# Patient Record
Sex: FEMALE | Race: BLACK OR AFRICAN AMERICAN | URBAN - METROPOLITAN AREA
[De-identification: names, ages, dates, MRNs, and addresses within clinical notes are randomized per-mention and may not be internally consistent; named-entity substitution may affect disease eponyms.]

---

## 2017-01-10 ENCOUNTER — PRE VISIT (OUTPATIENT)
Dept: ANESTHESIOLOGY | Facility: CLINIC | Age: 47
End: 2017-01-10

## 2017-01-10 NOTE — TELEPHONE ENCOUNTER
1.  Date/reason for appt: 1/31/17 at 2 PM - Degenerative Disc Disease  2.  Referring provider: Dr. Roya Mitchell at [facility]  3.  Call to patient (Yes / No - short description): no, referred  4.  Previous care at:   - [facility] (recs requested)

## 2017-01-16 NOTE — TELEPHONE ENCOUNTER
Records received from [facility], will forward to clinic. Waiting for imaging.  Included:   Office notes/ED- 10/2015- 11/2015, 2/2016-9/2016  Labs   Xray hip pelvis left on 4/12/16  CT angio neck on 5/12/16  CT chest on 5/12/16  MRI Lspine on 5/20/16    Additional records at [facility].

## 2017-01-17 NOTE — TELEPHONE ENCOUNTER
Fax sent to Jeannie ([facility]) to push images to Pacs.  Mailed ZIGGY for [facility] records to home address.

## 2017-01-24 ENCOUNTER — MYC MEDICAL ADVICE (OUTPATIENT)
Dept: ANESTHESIOLOGY | Facility: CLINIC | Age: 47
End: 2017-01-24

## 2017-01-24 ASSESSMENT — ENCOUNTER SYMPTOMS
DIARRHEA: 0
LEG PAIN: 1
DOUBLE VISION: 1
ABDOMINAL PAIN: 0
SMELL DISTURBANCE: 1
BREAST MASS: 0
EXERCISE INTOLERANCE: 0
EYE IRRITATION: 1
DECREASED APPETITE: 1
HOT FLASHES: 0
SORE THROAT: 0
SEIZURES: 0
HEMOPTYSIS: 0
SLEEP DISTURBANCES DUE TO BREATHING: 1
TACHYCARDIA: 0
JAUNDICE: 0
DECREASED CONCENTRATION: 1
RECTAL BLEEDING: 0
SPEECH CHANGE: 0
WEIGHT GAIN: 0
SPUTUM PRODUCTION: 1
HOARSE VOICE: 1
DECREASED LIBIDO: 0
RESPIRATORY PAIN: 0
NECK PAIN: 1
NIGHT SWEATS: 1
COUGH: 1
CHILLS: 1
RECTAL PAIN: 0
NERVOUS/ANXIOUS: 1
LIGHT-HEADEDNESS: 0
VOMITING: 0
FATIGUE: 1
SINUS PAIN: 0
DISTURBANCES IN COORDINATION: 1
MEMORY LOSS: 1
JOINT SWELLING: 1
ARTHRALGIAS: 1
BOWEL INCONTINENCE: 0
EYE REDNESS: 0
SINUS CONGESTION: 1
CONSTIPATION: 0
WHEEZING: 1
MYALGIAS: 1
HEADACHES: 0
TASTE DISTURBANCE: 0
WEIGHT LOSS: 0
HALLUCINATIONS: 0
NECK MASS: 0
COUGH DISTURBING SLEEP: 1
WEAKNESS: 1
NAUSEA: 1
SYNCOPE: 0
EYE PAIN: 0
ORTHOPNEA: 1
BREAST PAIN: 1
POLYDIPSIA: 0
MUSCLE WEAKNESS: 1
BLOOD IN STOOL: 0
PANIC: 1
NUMBNESS: 1
TINGLING: 1
BACK PAIN: 1
HEARTBURN: 1
PARALYSIS: 0
STIFFNESS: 1
HYPOTENSION: 0
POSTURAL DYSPNEA: 1
INCREASED ENERGY: 0
SHORTNESS OF BREATH: 1
PALPITATIONS: 0
TREMORS: 1
MUSCLE CRAMPS: 1
LEG SWELLING: 1
BLOATING: 1
DIZZINESS: 1
DYSPNEA ON EXERTION: 0
INSOMNIA: 1
EYE WATERING: 0
LOSS OF CONSCIOUSNESS: 0
FEVER: 0
TROUBLE SWALLOWING: 0
CLAUDICATION: 1
HYPERTENSION: 1
DEPRESSION: 0
SNORES LOUDLY: 1
ALTERED TEMPERATURE REGULATION: 0
POLYPHAGIA: 0

## 2017-01-24 ASSESSMENT — ANXIETY QUESTIONNAIRES
GAD7 TOTAL SCORE: 13
7. FEELING AFRAID AS IF SOMETHING AWFUL MIGHT HAPPEN: 2 = MORE THAN HALF THE DAYS
GAD7 TOTAL SCORE: 13

## 2017-01-25 ASSESSMENT — ANXIETY QUESTIONNAIRES: GAD7 TOTAL SCORE: 13

## 2017-01-30 NOTE — TELEPHONE ENCOUNTER
Per [facility], pt reached out to Sarmad Tavares regarding [facility] records. Message sent to Sarmad to see if pt returned her ZIGGY. Per Sarmad, pt has not. Sarmad will email patient our fax number to return the ZIGGY form.

## 2017-01-31 ENCOUNTER — OFFICE VISIT (OUTPATIENT)
Dept: ANESTHESIOLOGY | Facility: CLINIC | Age: 47
End: 2017-01-31

## 2017-01-31 VITALS
HEART RATE: 91 BPM | HEIGHT: 64 IN | BODY MASS INDEX: 38.55 KG/M2 | SYSTOLIC BLOOD PRESSURE: 150 MMHG | OXYGEN SATURATION: 98 % | WEIGHT: 225.8 LBS | DIASTOLIC BLOOD PRESSURE: 93 MMHG

## 2017-01-31 DIAGNOSIS — M47.817 LUMBOSACRAL SPONDYLOSIS WITHOUT MYELOPATHY: Primary | ICD-10-CM

## 2017-01-31 DIAGNOSIS — M79.18 MYOFASCIAL PAIN: ICD-10-CM

## 2017-01-31 RX ORDER — LIDOCAINE 50 MG/G
OINTMENT TOPICAL
COMMUNITY

## 2017-01-31 RX ORDER — TRAZODONE HYDROCHLORIDE 100 MG/1
TABLET ORAL
COMMUNITY

## 2017-01-31 RX ORDER — ATORVASTATIN CALCIUM 20 MG/1
20 TABLET, FILM COATED ORAL DAILY
COMMUNITY
Start: 2015-11-04

## 2017-01-31 RX ORDER — QUETIAPINE FUMARATE 100 MG/1
TABLET, FILM COATED ORAL
COMMUNITY

## 2017-01-31 RX ORDER — METHOCARBAMOL 500 MG/1
500 TABLET, FILM COATED ORAL 4 TIMES DAILY PRN
Qty: 120 TABLET | Refills: 3 | Status: SHIPPED | OUTPATIENT
Start: 2017-01-31

## 2017-01-31 RX ORDER — CETIRIZINE HYDROCHLORIDE 10 MG/1
10 TABLET ORAL PRN
COMMUNITY

## 2017-01-31 RX ORDER — GABAPENTIN 300 MG/1
600 CAPSULE ORAL 3 TIMES DAILY
Qty: 180 CAPSULE | Refills: 3 | Status: SHIPPED | OUTPATIENT
Start: 2017-01-31

## 2017-01-31 RX ORDER — AMMONIUM LACTATE 12 G/100G
LOTION TOPICAL
COMMUNITY
Start: 2016-09-22

## 2017-01-31 RX ORDER — PROCHLORPERAZINE MALEATE 10 MG
TABLET ORAL
COMMUNITY

## 2017-01-31 RX ORDER — FLUTICASONE PROPIONATE 50 MCG
SPRAY, SUSPENSION (ML) NASAL
COMMUNITY

## 2017-01-31 RX ORDER — GABAPENTIN 300 MG/1
CAPSULE ORAL
COMMUNITY

## 2017-01-31 RX ORDER — DIVALPROEX SODIUM 250 MG/1
250 TABLET, DELAYED RELEASE ORAL 3 TIMES DAILY
COMMUNITY

## 2017-01-31 RX ORDER — LISINOPRIL 20 MG/1
TABLET ORAL
COMMUNITY

## 2017-01-31 RX ORDER — IBUPROFEN 800 MG/1
TABLET, FILM COATED ORAL
COMMUNITY

## 2017-01-31 ASSESSMENT — PAIN SCALES - GENERAL: PAINLEVEL: EXTREME PAIN (8)

## 2017-01-31 NOTE — TELEPHONE ENCOUNTER
Pain records (office notes & injections) received from [facility], forwarded to clinic.

## 2017-01-31 NOTE — MR AVS SNAPSHOT
After Visit Summary   1/31/2017    Lorie Vann    MRN: [id number]           Patient Information     Date Of Birth          1970        Visit Information        Provider Department      1/31/2017 2:00 PM Nick Jackson MD [facility]        Today's Diagnoses     Lumbosacral spondylosis without myelopathy    -  1     Myofascial pain           Care Instructions    1. Start gabapentin as follows:  1 tab= 300mg    AM   PM   Bedtime  300mg (1 tab)  300mg (1 tab)  300 (1 tab).      AM   PM   Bedtime  300   300   600mg (2 tab).  After 3-4 days, increase as tolerated to the next line  600mg (2 tab)  300   600 (2 tab).  After 3-4 days, increase as tolerated to the next line  600mg (2 tab)  600mg (2 tab)  600 (2 tab).  After 3-4 days, increase as tolerated to the next line    Call with any problems or when you are at this dose. We can prescribe 600mg tabs going forwards.     Caution for sedation.   Do not drive until you know how the medication affects you.   You can go slower if you need to or increasing only one dose at a time.  Do not stop abruptly once at higher doses.  This medication must be tapered off.      2. Take muscle relaxer methocarbamol (robaxin)    3. Make an appointment for physical therapy. Call [phone]      Follow up: 6-8 weeks      To speak with a nurse, schedule/reschedule/cancel a clinic appointment, or request a medication refill call: [phone]     You can also reach us by [employer]: [email or web address]    For refills, please call on Monday, 1 week before your medication runs out. The doctors are not always in clinic, so this gives us time to get your prescriptions ready.  Please let us know the name of the medication you are requesting a refill of.                                   Follow-ups after your visit        Additional Services     PHYSICAL THERAPY REFERRAL       *This therapy referral will be filtered to a  centralized scheduling office at [facility] and the patient will receive a call to schedule an appointment at a [facility] location most convenient for them. *     [facility] provides Physical Therapy evaluation and treatment and many specialty services across the [facility] system.  If requesting a specialty program, please choose from the list below.    If you have not heard from the scheduling office within 2 business days, please call [phone] for all locations, with the exception of [place], please call [phone].  Treatment: Evaluation & Treatment  Special Instructions/Modalities: none  Special Programs: low back pain     Please be aware that coverage of these services is subject to the terms and limitations of your health insurance plan.  Call member services at your health plan with any benefit or coverage questions.      **Note to Provider:  If you are referring outside of [facility] for the therapy appointment, please list the name of the location in the  special instructions  above, print the referral and give to the patient to schedule the appointment.                  Who to contact     Please call your clinic at [phone] to:    Ask questions about your health    Make or cancel appointments    Discuss your medicines    Learn about your test results    Speak to your doctor   If you have compliments or concerns about an experience at your clinic, or if you wish to file a complaint, please contact [facility] Patient Relations at [phone] or email us at [email or web address]         Additional Information About Your Visit        MyChart Information     [employer] gives you secure access to your electronic health record. If you see a primary care provider, you can also send messages to your care team and make appointments. If you have questions, please call your primary care clinic.  If you do not have a primary care  "provider, please call [phone] and they will assist you.      SubHub is an electronic gateway that provides easy, online access to your medical records. With SubHub, you can request a clinic appointment, read your test results, renew a prescription or communicate with your care team.     To access your existing account, please contact your [facility] or call [phone] for assistance.        Care EveryWhere ID     This is your Care EveryWhere ID. This could be used by other organizations to access your [facility] medical records  [id number]        Your Vitals Were     Pulse Height BMI (Body Mass Index) Pulse Oximetry Last Period Breastfeeding?    91 1.626 m (5' 4\") 38.74 kg/m2 98% 01/17/2017 No       Blood Pressure from Last 3 Encounters:   01/31/17 150/93    Weight from Last 3 Encounters:   01/31/17 102.422 kg (225 lb 12.8 oz)              We Performed the Following     PHYSICAL THERAPY REFERRAL          Today's Medication Changes          These changes are accurate as of: 1/31/17  2:54 PM.  If you have any questions, ask your nurse or doctor.               Start taking these medicines.        Dose/Directions    methocarbamol 500 MG tablet   Commonly known as:  ROBAXIN   Used for:  Myofascial pain   Started by:  Nick Jackson MD        Dose:  500 mg   Take 1 tablet (500 mg) by mouth 4 times daily as needed for muscle spasms   Quantity:  120 tablet   Refills:  3         These medicines have changed or have updated prescriptions.        Dose/Directions    * gabapentin 300 MG capsule   Commonly known as:  NEURONTIN   This may have changed:  Another medication with the same name was added. Make sure you understand how and when to take each.   Changed by:  Nick Jackson MD        Refills:  0       * gabapentin 300 MG capsule   Commonly known as:  NEURONTIN   This may have changed:  You were already taking a medication with the same name, and this prescription was added. " Make sure you understand how and when to take each.   Used for:  Lumbosacral spondylosis without myelopathy   Changed by:  Nick Jackson MD        Dose:  600 mg   Take 2 capsules (600 mg) by mouth 3 times daily Take as directed in clinic   Quantity:  180 capsule   Refills:  3       * Notice:  This list has 2 medication(s) that are the same as other medications prescribed for you. Read the directions carefully, and ask your doctor or other care provider to review them with you.         Where to get your medicines      These medications were sent to [id number] - [place], [place] - [address], [place] [address]     Phone:  [phone]    - gabapentin 300 MG capsule  - methocarbamol 500 MG tablet             Primary Care Provider Office Phone # Fax #    Roya Guillen [phone]       [address] [address]        Thank you!     Thank you for choosing [facility]  for your care. Our goal is always to provide you with excellent care. Hearing back from our patients is one way we can continue to improve our services. Please take a few minutes to complete the written survey that you may receive in the mail after your visit with us. Thank you!             Your Updated Medication List - Protect others around you: Learn how to safely use, store and throw away your medicines at www.disposemymeds.org.          This list is accurate as of: 1/31/17  2:54 PM.  Always use your most recent med list.                   Brand Name Dispense Instructions for use    ammonium lactate 12 % lotion    LAC-HYDRIN         ASPIRIN LOW DOSE 81 MG EC tablet   Generic drug:  aspirin      81 mg daily       atorvastatin 20 MG tablet    LIPITOR     20 mg daily       cetirizine 10 MG tablet    zyrTEC     10 mg as needed       divalproex 250 MG EC tablet    DEPAKOTE     250 mg 3 times daily        dulaglutide 1.5 MG/0.5ML pen    TRULICITY         fluticasone 50 MCG/ACT spray    FLONASE         * gabapentin 300 MG capsule    NEURONTIN         * gabapentin 300 MG capsule    NEURONTIN    180 capsule    Take 2 capsules (600 mg) by mouth 3 times daily Take as directed in clinic       ibuprofen 800 MG tablet    ADVIL/MOTRIN         lidocaine 5 % ointment    XYLOCAINE         lisinopril 20 MG tablet    PRINIVIL/ZESTRIL         methocarbamol 500 MG tablet    ROBAXIN    120 tablet    Take 1 tablet (500 mg) by mouth 4 times daily as needed for muscle spasms       omeprazole 20 MG CR capsule    priLOSEC         prochlorperazine 10 MG tablet    COMPAZINE         QUEtiapine 100 MG tablet    SEROquel         sitagliptin 100 MG tablet    JANUVIA         traZODone 100 MG tablet    DESYREL         * Notice:  This list has 2 medication(s) that are the same as other medications prescribed for you. Read the directions carefully, and ask your doctor or other care provider to review them with you.

## 2017-01-31 NOTE — PATIENT INSTRUCTIONS
1. Start gabapentin as follows:  1 tab= 300mg    AM   PM   Bedtime  300mg (1 tab)  300mg (1 tab)  300 (1 tab).      AM   PM   Bedtime  300   300   600mg (2 tab).  After 3-4 days, increase as tolerated to the next line  600mg (2 tab)  300   600 (2 tab).  After 3-4 days, increase as tolerated to the next line  600mg (2 tab)  600mg (2 tab)  600 (2 tab).  After 3-4 days, increase as tolerated to the next line    Call with any problems or when you are at this dose. We can prescribe 600mg tabs going forwards.     Caution for sedation.   Do not drive until you know how the medication affects you.   You can go slower if you need to or increasing only one dose at a time.  Do not stop abruptly once at higher doses.  This medication must be tapered off.      2. Take muscle relaxer methocarbamol (robaxin)    3. Make an appointment for physical therapy. Call [phone]      Follow up: 6-8 weeks      To speak with a nurse, schedule/reschedule/cancel a clinic appointment, or request a medication refill call: [phone]     You can also reach us by [employer]: [email or web address]    For refills, please call on Monday, 1 week before your medication runs out. The doctors are not always in clinic, so this gives us time to get your prescriptions ready.  Please let us know the name of the medication you are requesting a refill of.

## 2017-01-31 NOTE — Clinical Note
1/31/2017       RE: Lorie Vann  [address]  [place] [address]     Dear Colleague,    Thank you for referring your patient, Lorie Vann, to the [facility] at [facility]. Please see a copy of my visit note below.    I had the pleasure of meeting Ms. Lorie Vann on 1/31/2017 in the outpatient pain clinic in consult for Dr. Guillen with regards to her low back pain.  Subjective:  Lorie Vann is a pleasant 46 year old female with a past medical history of diabetic peripheral neuropathy who presents to the pain clinic with a chief complaint of low back pain.  The pain began in 2003.  There was no inciting event of injury that precipitated the onset of her pain.  She describes the back pain as a 'knot right in my lower back.'  The pain is located mainly on the left lower aspect of her lumbar spine.  The pain waxes and wanes in severity. The pain is worse with sitting, standing, and walking for prolonged periods.  The pain is better with certain medications, including Lidocaine cream, gabapentin, and Percocet.  She states that she has not taken any opioids in the last 2 years.  The patient also has associated left leg pain.  She describes this as an achy feeling which travels from her left buttock, into her posterior thigh, and down into the posterior leg. This pain is intermittent. She has chronic pain and numbness in her feet and toes secondary to diabetic peripheral neuropathy.  The patient denies focal lower extremity weakness. No lower extremity sensory changes other than altered/ diminished in stocking pattern in distal lower extremities. No incontinence of bowel or bladder.    The patient previously has been seen at [facility] pain clinic in [place].  She was taking chronic opioids while being seen there.  She mentions that the last time she was seen at the previous clinic was over 2 years ago.  The reason why the patient  left was because she states that they were making her take a medication which she did not feel was appropriate.  She also states that one of her urine tests came back positive for alcohol.  At the previous clinic, the patient had both lumbar epidural steroid injections (L4/5, and L5/S1) as well as lumbar medial branch blocks (left L2/3/4/5).  She did not have significant or sustained relief with the epidural injections.  However, she states that the lumbar MBB provided significant pain relief the day of the procedure.  However, she never followed-up on having a radiofrequency ablation procedure performed.    ?  Past medical history.    Diabetes, obesity    past surgical history:  No history of spine surgeries     Medications:  Current Outpatient Prescriptions   Medication     aspirin (ASPIRIN LOW DOSE) 81 MG EC tablet     atorvastatin (LIPITOR) 20 MG tablet     cetirizine (ZYRTEC) 10 MG tablet     divalproex (DEPAKOTE) 250 MG EC tablet     dulaglutide (TRULICITY) 1.5 MG/0.5ML pen     fluticasone (FLONASE) 50 MCG/ACT spray     gabapentin (NEURONTIN) 300 MG capsule     ibuprofen (ADVIL/MOTRIN) 800 MG tablet     lidocaine (XYLOCAINE) 5 % ointment     lisinopril (PRINIVIL/ZESTRIL) 20 MG tablet     omeprazole (PRILOSEC) 20 MG CR capsule     prochlorperazine (COMPAZINE) 10 MG tablet     QUEtiapine (SEROQUEL) 100 MG tablet     sitagliptin (JANUVIA) 100 MG tablet     traZODone (DESYREL) 100 MG tablet     ammonium lactate (LAC-HYDRIN) 12 % lotion     No current facility-administered medications for this visit.     [place] and [place] Prescription Monitoring Program reviewed    Allergies:     Allergies   Allergen Reactions     Seasonal Allergies Other (See Comments)     Family History:  family history is not on file.  Social history:  Patient is not currently working.  Smoking-  3-4 cigarettes/day x 15 yrs.  EtOH- Denies.  Illicit drug use- Denies.   Review Of Systems  Skin: negative  Eyes: negative  Ears/Nose/Throat:  "negative  Respiratory: No shortness of breath, dyspnea on exertion, cough, or hemoptysis  Cardiovascular: negative  Gastrointestinal: negative  Genitourinary: negative  Musculoskeletal: back pain and joint pain  Neurologic: negative  Psychiatric: anxiety, depression, no thoughts of self-harm  Hematologic/Lymphatic/Immunologic: negative  Endocrine: diabetes    Objective:   /93 mmHg  Pulse 91  Ht 1.626 m (5' 4\")  Wt 102.422 kg (225 lb 12.8 oz)  BMI 38.74 kg/m2  SpO2 98%  LMP 01/17/2017  Breastfeeding? No  Body mass index is 38.74 kg/(m^2).  General: In no apparent distress  Mental status: Normal affect, pleasant  Head: Atraumatic, normocephalic  Eyes: Extra-ocular movements intact, no scleral icterus  Cardiovascular: Regular rate,   Respiratory: No respiratory distress  Abdomen: soft, non-distended  Msk: Bilateral hips, knees have normal range of motion. Pain with extension and rotation of lumbar spine.  Pain moderate with palpation over bilateral greater trochanteric bursae.  Neuro: AAOx3.   CN II-XII are grossly intact.   Strength 5/5 for bilateral hip flexion, knee extension, and plantarflexion.  4+/5 for left dorsiflexion, 5/5 for right dorsiflexion.  Sensation intact to light touch throughout the L2-S1 dermatomes of the bilateral lower extremities. Reflexes 2+/4 and symmetric for bilateral patellar, achilles.   Skin: No rashes or lesions noted on exposed areas of skin  Lymph: no supraclavicular lymphadenopathy    Imaging:   Lumbar MRI (5/20/16)  Findings:  Normal vertebral body alignment. No fracture vertebral body loss of height. No spinal ceases. No ligamentous injury. Normal marrow signal. No suspicious osseous lesions. Normal conus terminates at L1-2.  T10-11 and T11-12: No spinal canal or neural foraminal narrowing.  T12-L1, L1-2 and L2-3: No spinal canal or neural foraminal narrowing.  L3-4: No spinal canal or neural foraminal narrowing. Mild bilateral facet arthropathy.  L4-5: Annular bulge " with flattening of the ventral thecal sac. No narrowing of spinal canal. No neural foraminal narrowing. Mild bilateral facet arthropathy.  L5-S1: Disk degeneration with posterior disc bulge. Flattening of the ventral thecal sac. No impingement of the traversing S1 nerve roots. Ligamentum flavum facet hypertrophy results in mild narrowing of the bilateral neural foramina. Mild bilateral facet arthropathy.  Normal paraspinal soft tissues.    Impression:  1. Normal alignment. No fracture or spondylolisthesis   2. At L5-S1, disk degeneration with posterior disk bulging. No narrowing of the spinal canal. Stable mild narrowing of the bilateral neural foramina   3. No spinal canal or neural foraminal narrowing at the remaining levels.    Dictated by Prabhjot Hinkle MD @ May 20 2016 12:39PM    Assessment:  1. Chronic, axial low back pain: seems most likely facet-mediated pain. Less likely sacroiliac joint dysfunction. Perhaps occasional radicular pain as well, but this is not the main pain complaint. No red flags such as progressive weakness.   2. Diabetic peripheral neuropathy  3. Obesity     Barriers:  1. None    Plan:   1. Patient education: I went over the above diagnoses and treatment plan with her and answered all of her questions.  2. Imaging review: None  3. Exercise program:  referral for physical therapy as it has been some time since she has had formal therapy.   4. Medications:    1. increase her current gabapentin dose from 300 mg TID to 600 mg TID as tolerated.  2. start the patient on Methocarbamol 500 mg QID for muscle spasm/pain.  The patient previously has not tolerated Flexeril due to sedation effects.  5. Imaging Orders:  None  6. Interventions:  consider a bilateral lumbar medial branch block if the patient fails to improve with physical therapy and medications.  She had a previous lumbar MBB at [facility] pain clinic and noted significant improvement in her back pain.  However, she never followed up for the  radiofrequency ablation procedure.   Based on history and physical exam, unlikely tumor or mass causing pain  7. Referrals:  PT  8. Follow up:  6-8 weeks.    Thank you for the consult.   Patient seen and case discussed with Dr Heredia, Pain Fellow.    I agree with the personal history, family history, social history, and review of systems as completed by the fellow.  The remainder of the encounter was performed by me and scribed by the fellow. I have edited the above note. The note accurately reflects my personal services and the decisions made by me.     Nick Jackson MD  Pain Medicine  Physical Medicine and Rehabilitation  [facility] Department of Anesthesia

## 2017-01-31 NOTE — PROGRESS NOTES
I had the pleasure of meeting Ms. Lorie Vann on 1/31/2017 in the outpatient pain clinic in consult for Dr. Guillen with regards to her low back pain.  Subjective:  Lorie Vann is a pleasant 46 year old female with a past medical history of diabetic peripheral neuropathy who presents to the pain clinic with a chief complaint of low back pain.  The pain began in 2003.  There was no inciting event of injury that precipitated the onset of her pain.  She describes the back pain as a 'knot right in my lower back.'  The pain is located mainly on the left lower aspect of her lumbar spine.  The pain waxes and wanes in severity. The pain is worse with sitting, standing, and walking for prolonged periods.  The pain is better with certain medications, including Lidocaine cream, gabapentin, and Percocet.  She states that she has not taken any opioids in the last 2 years.  The patient also has associated left leg pain.  She describes this as an achy feeling which travels from her left buttock, into her posterior thigh, and down into the posterior leg. This pain is intermittent. She has chronic pain and numbness in her feet and toes secondary to diabetic peripheral neuropathy.  The patient denies focal lower extremity weakness. No lower extremity sensory changes other than altered/ diminished in stocking pattern in distal lower extremities. No incontinence of bowel or bladder.    The patient previously has been seen at [facility] pain clinic in [place].  She was taking chronic opioids while being seen there.  She mentions that the last time she was seen at the previous clinic was over 2 years ago.  The reason why the patient left was because she states that they were making her take a medication which she did not feel was appropriate.  She also states that one of her urine tests came back positive for alcohol.  At the previous clinic, the patient had both lumbar epidural steroid injections (L4/5, and L5/S1) as well as lumbar  medial branch blocks (left L2/3/4/5).  She did not have significant or sustained relief with the epidural injections.  However, she states that the lumbar MBB provided significant pain relief the day of the procedure.  However, she never followed-up on having a radiofrequency ablation procedure performed.    ?  Past medical history.    Diabetes, obesity    past surgical history:  No history of spine surgeries     Medications:  Current Outpatient Prescriptions   Medication     aspirin (ASPIRIN LOW DOSE) 81 MG EC tablet     atorvastatin (LIPITOR) 20 MG tablet     cetirizine (ZYRTEC) 10 MG tablet     divalproex (DEPAKOTE) 250 MG EC tablet     dulaglutide (TRULICITY) 1.5 MG/0.5ML pen     fluticasone (FLONASE) 50 MCG/ACT spray     gabapentin (NEURONTIN) 300 MG capsule     ibuprofen (ADVIL/MOTRIN) 800 MG tablet     lidocaine (XYLOCAINE) 5 % ointment     lisinopril (PRINIVIL/ZESTRIL) 20 MG tablet     omeprazole (PRILOSEC) 20 MG CR capsule     prochlorperazine (COMPAZINE) 10 MG tablet     QUEtiapine (SEROQUEL) 100 MG tablet     sitagliptin (JANUVIA) 100 MG tablet     traZODone (DESYREL) 100 MG tablet     ammonium lactate (LAC-HYDRIN) 12 % lotion     No current facility-administered medications for this visit.     [place] and [place] Prescription Monitoring Program reviewed    Allergies:     Allergies   Allergen Reactions     Seasonal Allergies Other (See Comments)     Family History:  family history is not on file.  Social history:  Patient is not currently working.  Smoking-  3-4 cigarettes/day x 15 yrs.  EtOH- Denies.  Illicit drug use- Denies.   Review Of Systems  Skin: negative  Eyes: negative  Ears/Nose/Throat: negative  Respiratory: No shortness of breath, dyspnea on exertion, cough, or hemoptysis  Cardiovascular: negative  Gastrointestinal: negative  Genitourinary: negative  Musculoskeletal: back pain and joint pain  Neurologic: negative  Psychiatric: anxiety, depression, no thoughts of  "self-harm  Hematologic/Lymphatic/Immunologic: negative  Endocrine: diabetes    Objective:   /93 mmHg  Pulse 91  Ht 1.626 m (5' 4\")  Wt 102.422 kg (225 lb 12.8 oz)  BMI 38.74 kg/m2  SpO2 98%  LMP 01/17/2017  Breastfeeding? No  Body mass index is 38.74 kg/(m^2).  General: In no apparent distress  Mental status: Normal affect, pleasant  Head: Atraumatic, normocephalic  Eyes: Extra-ocular movements intact, no scleral icterus  Cardiovascular: Regular rate,   Respiratory: No respiratory distress  Abdomen: soft, non-distended  Msk: Bilateral hips, knees have normal range of motion. Pain with extension and rotation of lumbar spine.  Pain moderate with palpation over bilateral greater trochanteric bursae.  Neuro: AAOx3.   CN II-XII are grossly intact.   Strength 5/5 for bilateral hip flexion, knee extension, and plantarflexion.  4+/5 for left dorsiflexion, 5/5 for right dorsiflexion.  Sensation intact to light touch throughout the L2-S1 dermatomes of the bilateral lower extremities. Reflexes 2+/4 and symmetric for bilateral patellar, achilles.   Skin: No rashes or lesions noted on exposed areas of skin  Lymph: no supraclavicular lymphadenopathy    Imaging:   Lumbar MRI (5/20/16)  Findings:  Normal vertebral body alignment. No fracture vertebral body loss of height. No spinal ceases. No ligamentous injury. Normal marrow signal. No suspicious osseous lesions. Normal conus terminates at L1-2.  T10-11 and T11-12: No spinal canal or neural foraminal narrowing.  T12-L1, L1-2 and L2-3: No spinal canal or neural foraminal narrowing.  L3-4: No spinal canal or neural foraminal narrowing. Mild bilateral facet arthropathy.  L4-5: Annular bulge with flattening of the ventral thecal sac. No narrowing of spinal canal. No neural foraminal narrowing. Mild bilateral facet arthropathy.  L5-S1: Disk degeneration with posterior disc bulge. Flattening of the ventral thecal sac. No impingement of the traversing S1 nerve roots. " Ligamentum flavum facet hypertrophy results in mild narrowing of the bilateral neural foramina. Mild bilateral facet arthropathy.  Normal paraspinal soft tissues.    Impression:  1. Normal alignment. No fracture or spondylolisthesis   2. At L5-S1, disk degeneration with posterior disk bulging. No narrowing of the spinal canal. Stable mild narrowing of the bilateral neural foramina   3. No spinal canal or neural foraminal narrowing at the remaining levels.    Dictated by Prabhjot Hinkle MD @ May 20 2016 12:39PM    Assessment:  1. Chronic, axial low back pain: seems most likely facet-mediated pain. Less likely sacroiliac joint dysfunction. Perhaps occasional radicular pain as well, but this is not the main pain complaint. No red flags such as progressive weakness.   2. Diabetic peripheral neuropathy  3. Obesity     Barriers:  1. None    Plan:   1. Patient education: I went over the above diagnoses and treatment plan with her and answered all of her questions.  2. Imaging review: None  3. Exercise program:  referral for physical therapy as it has been some time since she has had formal therapy.   4. Medications:    1. increase her current gabapentin dose from 300 mg TID to 600 mg TID as tolerated.  2. start the patient on Methocarbamol 500 mg QID for muscle spasm/pain.  The patient previously has not tolerated Flexeril due to sedation effects.  5. Imaging Orders:  None  6. Interventions:  consider a bilateral lumbar medial branch block if the patient fails to improve with physical therapy and medications.  She had a previous lumbar MBB at [facility] pain clinic and noted significant improvement in her back pain.  However, she never followed up for the radiofrequency ablation procedure.   Based on history and physical exam, unlikely tumor or mass causing pain  7. Referrals:  PT  8. Follow up:  6-8 weeks.    Thank you for the consult.   Patient seen and case discussed with Dr Heredia, Pain Fellow.    I agree with the personal  history, family history, social history, and review of systems as completed by the fellow.  The remainder of the encounter was performed by me and scribed by the fellow. I have edited the above note. The note accurately reflects my personal services and the decisions made by me.     Nick Jackson MD  Pain Medicine  Physical Medicine and Rehabilitation  [facility] Department of Anesthesia

## 2017-03-29 ENCOUNTER — RECORDS - HEALTHEAST (OUTPATIENT)
Dept: ADMINISTRATIVE | Facility: OTHER | Age: 47
End: 2017-03-29

## 2017-04-27 ENCOUNTER — OFFICE VISIT - HEALTHEAST (OUTPATIENT)
Dept: INTERNAL MEDICINE | Facility: CLINIC | Age: 47
End: 2017-04-27

## 2017-04-27 DIAGNOSIS — M25.511 CHRONIC RIGHT SHOULDER PAIN: ICD-10-CM

## 2017-04-27 DIAGNOSIS — G89.4 CHRONIC PAIN SYNDROME: ICD-10-CM

## 2017-04-27 DIAGNOSIS — G37.9 DEMYELINATING DISEASE (H): ICD-10-CM

## 2017-04-27 DIAGNOSIS — E11.42 TYPE 2 DIABETES MELLITUS WITH DIABETIC POLYNEUROPATHY, WITHOUT LONG-TERM CURRENT USE OF INSULIN (H): ICD-10-CM

## 2017-04-27 DIAGNOSIS — F17.200 TOBACCO DEPENDENCE: ICD-10-CM

## 2017-04-27 DIAGNOSIS — G89.29 CHRONIC RIGHT SHOULDER PAIN: ICD-10-CM

## 2017-04-27 ASSESSMENT — MIFFLIN-ST. JEOR: SCORE: 1617.42

## 2017-04-28 ENCOUNTER — COMMUNICATION - HEALTHEAST (OUTPATIENT)
Dept: INTERNAL MEDICINE | Facility: CLINIC | Age: 47
End: 2017-04-28

## 2017-05-10 ENCOUNTER — COMMUNICATION - HEALTHEAST (OUTPATIENT)
Dept: INTERNAL MEDICINE | Facility: CLINIC | Age: 47
End: 2017-05-10

## 2017-05-11 ENCOUNTER — RECORDS - HEALTHEAST (OUTPATIENT)
Dept: ADMINISTRATIVE | Facility: OTHER | Age: 47
End: 2017-05-11

## 2017-05-31 ENCOUNTER — RECORDS - HEALTHEAST (OUTPATIENT)
Dept: ADMINISTRATIVE | Facility: OTHER | Age: 47
End: 2017-05-31

## 2017-06-06 ENCOUNTER — OFFICE VISIT - HEALTHEAST (OUTPATIENT)
Dept: INTERNAL MEDICINE | Facility: CLINIC | Age: 47
End: 2017-06-06

## 2017-06-06 DIAGNOSIS — M75.111 INCOMPLETE TEAR OF RIGHT ROTATOR CUFF: ICD-10-CM

## 2017-06-06 DIAGNOSIS — E11.9 DIABETES MELLITUS (H): ICD-10-CM

## 2017-06-06 ASSESSMENT — MIFFLIN-ST. JEOR: SCORE: 1543.94

## 2017-07-13 ENCOUNTER — COMMUNICATION - HEALTHEAST (OUTPATIENT)
Dept: INTERNAL MEDICINE | Facility: CLINIC | Age: 47
End: 2017-07-13

## 2017-07-14 ENCOUNTER — COMMUNICATION - HEALTHEAST (OUTPATIENT)
Dept: INTERNAL MEDICINE | Facility: CLINIC | Age: 47
End: 2017-07-14

## 2017-07-14 ENCOUNTER — OFFICE VISIT - HEALTHEAST (OUTPATIENT)
Dept: FAMILY MEDICINE | Facility: CLINIC | Age: 47
End: 2017-07-14

## 2017-07-14 DIAGNOSIS — Z86.19 H/O SYPHILIS: ICD-10-CM

## 2017-07-14 DIAGNOSIS — N89.8 VAGINAL ITCHING: ICD-10-CM

## 2017-07-14 DIAGNOSIS — Z20.2 POSSIBLE EXPOSURE TO STD: ICD-10-CM

## 2017-07-14 DIAGNOSIS — G89.29 CHRONIC PAIN: ICD-10-CM

## 2017-07-14 LAB — HIV 1+2 AB+HIV1 P24 AG SERPL QL IA: NEGATIVE

## 2017-07-14 ASSESSMENT — MIFFLIN-ST. JEOR: SCORE: 1527.61

## 2017-07-15 ENCOUNTER — COMMUNICATION - HEALTHEAST (OUTPATIENT)
Dept: INTERNAL MEDICINE | Facility: CLINIC | Age: 47
End: 2017-07-15

## 2017-07-15 LAB — SYPHILIS RPR SCREEN - HISTORICAL: NORMAL

## 2017-08-15 ENCOUNTER — RECORDS - HEALTHEAST (OUTPATIENT)
Dept: ADMINISTRATIVE | Facility: OTHER | Age: 47
End: 2017-08-15

## 2017-09-19 ENCOUNTER — COMMUNICATION - HEALTHEAST (OUTPATIENT)
Dept: INTERNAL MEDICINE | Facility: CLINIC | Age: 47
End: 2017-09-19

## 2017-11-21 ENCOUNTER — RECORDS - HEALTHEAST (OUTPATIENT)
Dept: ADMINISTRATIVE | Facility: OTHER | Age: 47
End: 2017-11-21

## 2018-01-07 ENCOUNTER — HEALTH MAINTENANCE LETTER (OUTPATIENT)
Age: 48
End: 2018-01-07

## 2018-04-11 ENCOUNTER — OFFICE VISIT - HEALTHEAST (OUTPATIENT)
Dept: FAMILY MEDICINE | Facility: CLINIC | Age: 48
End: 2018-04-11

## 2018-04-11 DIAGNOSIS — I49.9 IRREGULAR HEARTBEAT: ICD-10-CM

## 2018-04-11 DIAGNOSIS — Z00.00 ANNUAL PHYSICAL EXAM: ICD-10-CM

## 2018-04-11 DIAGNOSIS — F17.200 TOBACCO DEPENDENCE: ICD-10-CM

## 2018-04-11 DIAGNOSIS — N92.6 IRREGULAR MENSES: ICD-10-CM

## 2018-04-11 DIAGNOSIS — G89.29 OTHER CHRONIC PAIN: ICD-10-CM

## 2018-04-11 DIAGNOSIS — F32.A CHRONIC DEPRESSION: ICD-10-CM

## 2018-04-11 DIAGNOSIS — E11.42 TYPE 2 DIABETES MELLITUS WITH DIABETIC POLYNEUROPATHY, WITHOUT LONG-TERM CURRENT USE OF INSULIN (H): ICD-10-CM

## 2018-04-11 DIAGNOSIS — F25.0 SCHIZOAFFECTIVE DISORDER, BIPOLAR TYPE (H): ICD-10-CM

## 2018-04-11 DIAGNOSIS — G62.9 PERIPHERAL NEUROPATHY: ICD-10-CM

## 2018-04-11 DIAGNOSIS — E11.49 TYPE 2 DIABETES MELLITUS WITH NEUROLOGICAL MANIFESTATIONS (H): ICD-10-CM

## 2018-04-11 DIAGNOSIS — Z11.3 SCREEN FOR STD (SEXUALLY TRANSMITTED DISEASE): ICD-10-CM

## 2018-04-11 DIAGNOSIS — N89.8 VAGINAL IRRITATION: ICD-10-CM

## 2018-04-11 DIAGNOSIS — F30.9 BIPOLAR I DISORDER, SINGLE MANIC EPISODE (H): ICD-10-CM

## 2018-04-11 DIAGNOSIS — R87.619 ABNORMAL PAP SMEAR OF CERVIX: ICD-10-CM

## 2018-04-11 DIAGNOSIS — I10 HYPERTENSION WITH GOAL BLOOD PRESSURE LESS THAN 140/90: ICD-10-CM

## 2018-04-11 LAB
CLUE CELLS: NORMAL
HCG UR QL: NEGATIVE
HIV 1+2 AB+HIV1 P24 AG SERPL QL IA: NEGATIVE
SP GR UR STRIP: >=1.03 (ref 1–1.03)
TRICHOMONAS, WET PREP: NORMAL
YEAST, WET PREP: NORMAL

## 2018-04-11 ASSESSMENT — MIFFLIN-ST. JEOR: SCORE: 1499.82

## 2018-04-12 LAB
C TRACH DNA SPEC QL PROBE+SIG AMP: NEGATIVE
HPV SOURCE: ABNORMAL
HUMAN PAPILLOMA VIRUS 16 DNA: NEGATIVE
HUMAN PAPILLOMA VIRUS 18 DNA: NEGATIVE
HUMAN PAPILLOMA VIRUS FINAL DIAGNOSIS: ABNORMAL
HUMAN PAPILLOMA VIRUS OTHER HR: POSITIVE
N GONORRHOEA DNA SPEC QL NAA+PROBE: NEGATIVE
RPR (REFLEX ORDER ONLY): NORMAL
SPECIMEN DESCRIPTION: ABNORMAL
T PALLIDUM AB SER QL: ABNORMAL

## 2018-04-13 LAB — T PALLIDUM AB SER QL AGGL: REACTIVE

## 2018-04-17 LAB
BKR LAB AP ABNORMAL BLEEDING: NO
BKR LAB AP BIRTH CONTROL/HORMONES: NORMAL
BKR LAB AP CERVICAL APPEARANCE: NORMAL
BKR LAB AP GYN ADEQUACY: NORMAL
BKR LAB AP GYN INTERPRETATION: NORMAL
BKR LAB AP GYN OTHER FINDINGS: NORMAL
BKR LAB AP HPV REFLEX: NORMAL
BKR LAB AP LMP: NORMAL
BKR LAB AP PATIENT STATUS: NORMAL
BKR LAB AP PREVIOUS ABNORMAL: NORMAL
BKR LAB AP PREVIOUS NORMAL: 2017
HIGH RISK?: YES
PATH REPORT.COMMENTS IMP SPEC: NORMAL
RESULT FLAG (HE HISTORICAL CONVERSION): NORMAL

## 2018-04-18 ENCOUNTER — COMMUNICATION - HEALTHEAST (OUTPATIENT)
Dept: SCHEDULING | Facility: CLINIC | Age: 48
End: 2018-04-18

## 2018-05-17 ENCOUNTER — COMMUNICATION - HEALTHEAST (OUTPATIENT)
Dept: FAMILY MEDICINE | Facility: CLINIC | Age: 48
End: 2018-05-17

## 2018-05-29 ENCOUNTER — OFFICE VISIT - HEALTHEAST (OUTPATIENT)
Dept: FAMILY MEDICINE | Facility: CLINIC | Age: 48
End: 2018-05-29

## 2018-05-29 DIAGNOSIS — B35.3 TINEA PEDIS OF BOTH FEET: ICD-10-CM

## 2018-05-29 DIAGNOSIS — I10 HYPERTENSION WITH GOAL BLOOD PRESSURE LESS THAN 140/90: ICD-10-CM

## 2018-05-29 DIAGNOSIS — R35.0 URINARY FREQUENCY: ICD-10-CM

## 2018-05-29 DIAGNOSIS — E11.49 TYPE 2 DIABETES MELLITUS WITH NEUROLOGICAL MANIFESTATIONS (H): ICD-10-CM

## 2018-05-29 DIAGNOSIS — F25.0 SCHIZOAFFECTIVE DISORDER, BIPOLAR TYPE (H): ICD-10-CM

## 2018-05-29 DIAGNOSIS — N89.8 VAGINAL ITCHING: ICD-10-CM

## 2018-05-29 DIAGNOSIS — E03.9 HYPOTHYROIDISM: ICD-10-CM

## 2018-05-29 DIAGNOSIS — Z12.31 VISIT FOR SCREENING MAMMOGRAM: ICD-10-CM

## 2018-05-29 DIAGNOSIS — E78.2 MIXED HYPERLIPIDEMIA: ICD-10-CM

## 2018-05-29 DIAGNOSIS — F32.A CHRONIC DEPRESSION: ICD-10-CM

## 2018-05-29 LAB
ALBUMIN SERPL-MCNC: 3.9 G/DL (ref 3.5–5)
ALBUMIN UR-MCNC: NEGATIVE MG/DL
ALP SERPL-CCNC: 83 U/L (ref 45–120)
ALT SERPL W P-5'-P-CCNC: 14 U/L (ref 0–45)
ANION GAP SERPL CALCULATED.3IONS-SCNC: 11 MMOL/L (ref 5–18)
APPEARANCE UR: CLEAR
AST SERPL W P-5'-P-CCNC: 13 U/L (ref 0–40)
BILIRUB DIRECT SERPL-MCNC: <0.1 MG/DL
BILIRUB SERPL-MCNC: 0.3 MG/DL (ref 0–1)
BILIRUB UR QL STRIP: NEGATIVE
BUN SERPL-MCNC: 24 MG/DL (ref 8–22)
CALCIUM SERPL-MCNC: 10 MG/DL (ref 8.5–10.5)
CHLORIDE BLD-SCNC: 108 MMOL/L (ref 98–107)
CHOLEST SERPL-MCNC: 212 MG/DL
CLUE CELLS: NORMAL
CO2 SERPL-SCNC: 23 MMOL/L (ref 22–31)
COLOR UR AUTO: YELLOW
CREAT SERPL-MCNC: 0.8 MG/DL (ref 0.6–1.1)
FASTING STATUS PATIENT QL REPORTED: YES
GFR SERPL CREATININE-BSD FRML MDRD: >60 ML/MIN/1.73M2
GLUCOSE BLD-MCNC: 84 MG/DL (ref 70–125)
GLUCOSE UR STRIP-MCNC: NEGATIVE MG/DL
HBA1C MFR BLD: 6.8 % (ref 3.5–6)
HDLC SERPL-MCNC: 53 MG/DL
HGB UR QL STRIP: NEGATIVE
KETONES UR STRIP-MCNC: NEGATIVE MG/DL
LDLC SERPL CALC-MCNC: 119 MG/DL
LEUKOCYTE ESTERASE UR QL STRIP: NEGATIVE
NITRATE UR QL: NEGATIVE
PH UR STRIP: 5 [PH] (ref 5–8)
POTASSIUM BLD-SCNC: 4.3 MMOL/L (ref 3.5–5)
PROT SERPL-MCNC: 6.5 G/DL (ref 6–8)
SODIUM SERPL-SCNC: 142 MMOL/L (ref 136–145)
SP GR UR STRIP: >=1.03 (ref 1–1.03)
TRICHOMONAS, WET PREP: NORMAL
TRIGL SERPL-MCNC: 201 MG/DL
TSH SERPL DL<=0.005 MIU/L-ACNC: 0.41 UIU/ML (ref 0.3–5)
UROBILINOGEN UR STRIP-ACNC: NORMAL
YEAST, WET PREP: NORMAL

## 2018-05-30 LAB — BACTERIA SPEC CULT: NO GROWTH

## 2018-06-08 ENCOUNTER — AMBULATORY - HEALTHEAST (OUTPATIENT)
Dept: EDUCATION SERVICES | Facility: CLINIC | Age: 48
End: 2018-06-08

## 2018-06-08 DIAGNOSIS — E11.49 TYPE 2 DIABETES MELLITUS WITH NEUROLOGICAL MANIFESTATIONS (H): ICD-10-CM

## 2018-08-16 ENCOUNTER — RECORDS - HEALTHEAST (OUTPATIENT)
Dept: ADMINISTRATIVE | Facility: OTHER | Age: 48
End: 2018-08-16

## 2018-09-04 ENCOUNTER — OFFICE VISIT - HEALTHEAST (OUTPATIENT)
Dept: ADDICTION MEDICINE | Facility: CLINIC | Age: 48
End: 2018-09-04

## 2018-09-04 DIAGNOSIS — F14.20 COCAINE USE DISORDER, MODERATE, DEPENDENCE (H): ICD-10-CM

## 2018-09-04 DIAGNOSIS — F41.1 GENERALIZED ANXIETY DISORDER: ICD-10-CM

## 2018-09-04 DIAGNOSIS — F25.0 SCHIZOAFFECTIVE DISORDER, BIPOLAR TYPE (H): ICD-10-CM

## 2018-09-06 ENCOUNTER — AMBULATORY - HEALTHEAST (OUTPATIENT)
Dept: ADDICTION MEDICINE | Facility: CLINIC | Age: 48
End: 2018-09-06

## 2018-09-07 ENCOUNTER — AMBULATORY - HEALTHEAST (OUTPATIENT)
Dept: ADDICTION MEDICINE | Facility: CLINIC | Age: 48
End: 2018-09-07

## 2018-09-10 ENCOUNTER — COMMUNICATION - HEALTHEAST (OUTPATIENT)
Dept: ADDICTION MEDICINE | Facility: CLINIC | Age: 48
End: 2018-09-10

## 2018-09-10 ENCOUNTER — OFFICE VISIT - HEALTHEAST (OUTPATIENT)
Dept: ADDICTION MEDICINE | Facility: CLINIC | Age: 48
End: 2018-09-10

## 2018-09-10 DIAGNOSIS — F14.20 COCAINE USE DISORDER, MODERATE, DEPENDENCE (H): ICD-10-CM

## 2018-09-11 ENCOUNTER — OFFICE VISIT - HEALTHEAST (OUTPATIENT)
Dept: ADDICTION MEDICINE | Facility: CLINIC | Age: 48
End: 2018-09-11

## 2018-09-11 DIAGNOSIS — F14.20 COCAINE USE DISORDER, MODERATE, DEPENDENCE (H): ICD-10-CM

## 2018-09-15 ENCOUNTER — AMBULATORY - HEALTHEAST (OUTPATIENT)
Dept: ADDICTION MEDICINE | Facility: CLINIC | Age: 48
End: 2018-09-15

## 2018-09-17 ENCOUNTER — OFFICE VISIT - HEALTHEAST (OUTPATIENT)
Dept: ADDICTION MEDICINE | Facility: CLINIC | Age: 48
End: 2018-09-17

## 2018-09-17 DIAGNOSIS — F14.20 COCAINE USE DISORDER, MODERATE, DEPENDENCE (H): ICD-10-CM

## 2018-09-22 ENCOUNTER — AMBULATORY - HEALTHEAST (OUTPATIENT)
Dept: ADDICTION MEDICINE | Facility: CLINIC | Age: 48
End: 2018-09-22

## 2018-10-04 ENCOUNTER — COMMUNICATION - HEALTHEAST (OUTPATIENT)
Dept: ADDICTION MEDICINE | Facility: CLINIC | Age: 48
End: 2018-10-04

## 2018-10-06 ENCOUNTER — AMBULATORY - HEALTHEAST (OUTPATIENT)
Dept: ADDICTION MEDICINE | Facility: CLINIC | Age: 48
End: 2018-10-06

## 2018-10-08 ENCOUNTER — OFFICE VISIT - HEALTHEAST (OUTPATIENT)
Dept: ADDICTION MEDICINE | Facility: CLINIC | Age: 48
End: 2018-10-08

## 2018-10-08 ENCOUNTER — AMBULATORY - HEALTHEAST (OUTPATIENT)
Dept: ADDICTION MEDICINE | Facility: CLINIC | Age: 48
End: 2018-10-08

## 2018-10-08 DIAGNOSIS — F14.20 COCAINE USE DISORDER, MODERATE, DEPENDENCE (H): ICD-10-CM

## 2018-10-09 ENCOUNTER — OFFICE VISIT - HEALTHEAST (OUTPATIENT)
Dept: ADDICTION MEDICINE | Facility: CLINIC | Age: 48
End: 2018-10-09

## 2018-10-09 DIAGNOSIS — F14.20 COCAINE USE DISORDER, MODERATE, DEPENDENCE (H): ICD-10-CM

## 2018-10-12 ENCOUNTER — AMBULATORY - HEALTHEAST (OUTPATIENT)
Dept: ADDICTION MEDICINE | Facility: CLINIC | Age: 48
End: 2018-10-12

## 2018-10-14 ENCOUNTER — COMMUNICATION - HEALTHEAST (OUTPATIENT)
Dept: FAMILY MEDICINE | Facility: CLINIC | Age: 48
End: 2018-10-14

## 2018-10-14 DIAGNOSIS — B35.3 TINEA PEDIS OF BOTH FEET: ICD-10-CM

## 2018-10-14 DIAGNOSIS — Z91.09 ENVIRONMENTAL ALLERGIES: ICD-10-CM

## 2018-10-14 DIAGNOSIS — F32.A CHRONIC DEPRESSION: ICD-10-CM

## 2018-10-14 DIAGNOSIS — E11.9 DIABETES MELLITUS, TYPE 2 (H): ICD-10-CM

## 2018-10-14 DIAGNOSIS — G89.4 CHRONIC PAIN SYNDROME: ICD-10-CM

## 2018-10-14 DIAGNOSIS — J30.89 ENVIRONMENTAL AND SEASONAL ALLERGIES: ICD-10-CM

## 2018-10-15 ENCOUNTER — OFFICE VISIT - HEALTHEAST (OUTPATIENT)
Dept: ADDICTION MEDICINE | Facility: CLINIC | Age: 48
End: 2018-10-15

## 2018-10-15 DIAGNOSIS — F14.20 COCAINE USE DISORDER, MODERATE, DEPENDENCE (H): ICD-10-CM

## 2018-10-16 ENCOUNTER — OFFICE VISIT - HEALTHEAST (OUTPATIENT)
Dept: ADDICTION MEDICINE | Facility: CLINIC | Age: 48
End: 2018-10-16

## 2018-10-16 DIAGNOSIS — F14.20 COCAINE USE DISORDER, MODERATE, DEPENDENCE (H): ICD-10-CM

## 2018-10-17 ENCOUNTER — OFFICE VISIT - HEALTHEAST (OUTPATIENT)
Dept: ADDICTION MEDICINE | Facility: CLINIC | Age: 48
End: 2018-10-17

## 2018-10-17 DIAGNOSIS — F14.20 COCAINE USE DISORDER, MODERATE, DEPENDENCE (H): ICD-10-CM

## 2018-10-18 ENCOUNTER — AMBULATORY - HEALTHEAST (OUTPATIENT)
Dept: ADDICTION MEDICINE | Facility: CLINIC | Age: 48
End: 2018-10-18

## 2018-10-19 ENCOUNTER — AMBULATORY - HEALTHEAST (OUTPATIENT)
Dept: LAB | Facility: CLINIC | Age: 48
End: 2018-10-19

## 2018-10-19 ENCOUNTER — OFFICE VISIT - HEALTHEAST (OUTPATIENT)
Dept: ADDICTION MEDICINE | Facility: CLINIC | Age: 48
End: 2018-10-19

## 2018-10-19 DIAGNOSIS — F14.20 COCAINE USE DISORDER, MODERATE, DEPENDENCE (H): ICD-10-CM

## 2018-10-19 LAB
AMPHETAMINES UR QL SCN: ABNORMAL
BARBITURATES UR QL: ABNORMAL
BENZODIAZ UR QL: ABNORMAL
CANNABINOIDS UR QL SCN: ABNORMAL
COCAINE UR QL: ABNORMAL
CREAT UR-MCNC: 58.5 MG/DL
METHADONE UR QL SCN: ABNORMAL
OPIATES UR QL SCN: ABNORMAL
OXYCODONE UR QL: ABNORMAL
PCP UR QL SCN: ABNORMAL

## 2018-10-23 ENCOUNTER — OFFICE VISIT - HEALTHEAST (OUTPATIENT)
Dept: ADDICTION MEDICINE | Facility: CLINIC | Age: 48
End: 2018-10-23

## 2018-10-23 DIAGNOSIS — F14.20 COCAINE USE DISORDER, MODERATE, DEPENDENCE (H): ICD-10-CM

## 2018-10-23 LAB
ETHYL GLUCURONIDE UR CFM-MCNC: 4820 NG/ML
ETHYL SULFATE UR CFM-MCNC: 710 NG/ML

## 2018-10-24 ENCOUNTER — OFFICE VISIT - HEALTHEAST (OUTPATIENT)
Dept: ADDICTION MEDICINE | Facility: CLINIC | Age: 48
End: 2018-10-24

## 2018-10-24 DIAGNOSIS — F14.20 COCAINE USE DISORDER, MODERATE, DEPENDENCE (H): ICD-10-CM

## 2018-10-25 ENCOUNTER — AMBULATORY - HEALTHEAST (OUTPATIENT)
Dept: ADDICTION MEDICINE | Facility: CLINIC | Age: 48
End: 2018-10-25

## 2018-10-25 ENCOUNTER — OFFICE VISIT - HEALTHEAST (OUTPATIENT)
Dept: FAMILY MEDICINE | Facility: CLINIC | Age: 48
End: 2018-10-25

## 2018-10-25 DIAGNOSIS — I10 HYPERTENSION WITH GOAL BLOOD PRESSURE LESS THAN 140/90: ICD-10-CM

## 2018-10-25 DIAGNOSIS — E11.49 TYPE 2 DIABETES MELLITUS WITH NEUROLOGICAL MANIFESTATIONS (H): ICD-10-CM

## 2018-10-25 DIAGNOSIS — E78.2 MIXED HYPERLIPIDEMIA: ICD-10-CM

## 2018-10-25 DIAGNOSIS — K29.70 GASTRITIS: ICD-10-CM

## 2018-10-25 DIAGNOSIS — F25.0 SCHIZOAFFECTIVE DISORDER, BIPOLAR TYPE (H): ICD-10-CM

## 2018-10-25 LAB — HBA1C MFR BLD: 6.4 % (ref 3.5–6)

## 2018-10-29 ENCOUNTER — OFFICE VISIT - HEALTHEAST (OUTPATIENT)
Dept: ADDICTION MEDICINE | Facility: CLINIC | Age: 48
End: 2018-10-29

## 2018-10-29 DIAGNOSIS — F14.20 COCAINE USE DISORDER, MODERATE, DEPENDENCE (H): ICD-10-CM

## 2018-10-30 ENCOUNTER — OFFICE VISIT - HEALTHEAST (OUTPATIENT)
Dept: ADDICTION MEDICINE | Facility: CLINIC | Age: 48
End: 2018-10-30

## 2018-10-30 DIAGNOSIS — F14.20 COCAINE USE DISORDER, MODERATE, DEPENDENCE (H): ICD-10-CM

## 2018-10-31 ENCOUNTER — OFFICE VISIT - HEALTHEAST (OUTPATIENT)
Dept: ADDICTION MEDICINE | Facility: CLINIC | Age: 48
End: 2018-10-31

## 2018-10-31 DIAGNOSIS — F14.20 COCAINE USE DISORDER, MODERATE, DEPENDENCE (H): ICD-10-CM

## 2018-11-01 ENCOUNTER — AMBULATORY - HEALTHEAST (OUTPATIENT)
Dept: ADDICTION MEDICINE | Facility: CLINIC | Age: 48
End: 2018-11-01

## 2018-11-07 ENCOUNTER — AMBULATORY - HEALTHEAST (OUTPATIENT)
Dept: ADDICTION MEDICINE | Facility: CLINIC | Age: 48
End: 2018-11-07

## 2018-11-14 ENCOUNTER — AMBULATORY - HEALTHEAST (OUTPATIENT)
Dept: ADDICTION MEDICINE | Facility: CLINIC | Age: 48
End: 2018-11-14

## 2018-11-15 ENCOUNTER — COMMUNICATION - HEALTHEAST (OUTPATIENT)
Dept: ADDICTION MEDICINE | Facility: CLINIC | Age: 48
End: 2018-11-15

## 2019-02-06 ENCOUNTER — AMBULATORY - HEALTHEAST (OUTPATIENT)
Dept: ADDICTION MEDICINE | Facility: CLINIC | Age: 49
End: 2019-02-06

## 2019-02-06 ENCOUNTER — OFFICE VISIT - HEALTHEAST (OUTPATIENT)
Dept: ADDICTION MEDICINE | Facility: CLINIC | Age: 49
End: 2019-02-06

## 2019-02-06 DIAGNOSIS — F14.20 COCAINE USE DISORDER, MODERATE, DEPENDENCE (H): ICD-10-CM

## 2019-02-07 ENCOUNTER — AMBULATORY - HEALTHEAST (OUTPATIENT)
Dept: ADDICTION MEDICINE | Facility: CLINIC | Age: 49
End: 2019-02-07

## 2019-02-08 ENCOUNTER — AMBULATORY - HEALTHEAST (OUTPATIENT)
Dept: ADDICTION MEDICINE | Facility: CLINIC | Age: 49
End: 2019-02-08

## 2019-02-08 ENCOUNTER — OFFICE VISIT - HEALTHEAST (OUTPATIENT)
Dept: ADDICTION MEDICINE | Facility: CLINIC | Age: 49
End: 2019-02-08

## 2019-02-08 DIAGNOSIS — F14.20 COCAINE USE DISORDER, MODERATE, DEPENDENCE (H): ICD-10-CM

## 2019-02-13 ENCOUNTER — OFFICE VISIT - HEALTHEAST (OUTPATIENT)
Dept: ADDICTION MEDICINE | Facility: CLINIC | Age: 49
End: 2019-02-13

## 2019-02-13 DIAGNOSIS — F14.20 COCAINE USE DISORDER, MODERATE, DEPENDENCE (H): ICD-10-CM

## 2019-02-15 ENCOUNTER — OFFICE VISIT - HEALTHEAST (OUTPATIENT)
Dept: ADDICTION MEDICINE | Facility: CLINIC | Age: 49
End: 2019-02-15

## 2019-02-15 ENCOUNTER — RECORDS - HEALTHEAST (OUTPATIENT)
Dept: MAMMOGRAPHY | Facility: CLINIC | Age: 49
End: 2019-02-15

## 2019-02-15 DIAGNOSIS — F14.20 COCAINE USE DISORDER, MODERATE, DEPENDENCE (H): ICD-10-CM

## 2019-02-15 DIAGNOSIS — Z12.31 ENCOUNTER FOR SCREENING MAMMOGRAM FOR MALIGNANT NEOPLASM OF BREAST: ICD-10-CM

## 2019-02-16 ENCOUNTER — AMBULATORY - HEALTHEAST (OUTPATIENT)
Dept: ADDICTION MEDICINE | Facility: CLINIC | Age: 49
End: 2019-02-16

## 2019-02-18 ENCOUNTER — COMMUNICATION - HEALTHEAST (OUTPATIENT)
Dept: ADDICTION MEDICINE | Facility: CLINIC | Age: 49
End: 2019-02-18

## 2019-02-19 ENCOUNTER — RECORDS - HEALTHEAST (OUTPATIENT)
Dept: RADIOLOGY | Facility: CLINIC | Age: 49
End: 2019-02-19

## 2019-02-21 ENCOUNTER — COMMUNICATION - HEALTHEAST (OUTPATIENT)
Dept: ADDICTION MEDICINE | Facility: CLINIC | Age: 49
End: 2019-02-21

## 2019-02-21 ENCOUNTER — AMBULATORY - HEALTHEAST (OUTPATIENT)
Dept: ADDICTION MEDICINE | Facility: CLINIC | Age: 49
End: 2019-02-21

## 2019-03-01 ENCOUNTER — COMMUNICATION - HEALTHEAST (OUTPATIENT)
Dept: ADDICTION MEDICINE | Facility: CLINIC | Age: 49
End: 2019-03-01

## 2019-03-01 ENCOUNTER — AMBULATORY - HEALTHEAST (OUTPATIENT)
Dept: ADDICTION MEDICINE | Facility: CLINIC | Age: 49
End: 2019-03-01

## 2019-03-12 ENCOUNTER — AMBULATORY - HEALTHEAST (OUTPATIENT)
Dept: ADDICTION MEDICINE | Facility: CLINIC | Age: 49
End: 2019-03-12

## 2019-03-13 ENCOUNTER — COMMUNICATION - HEALTHEAST (OUTPATIENT)
Dept: INTERNAL MEDICINE | Facility: CLINIC | Age: 49
End: 2019-03-13

## 2019-03-13 ENCOUNTER — COMMUNICATION - HEALTHEAST (OUTPATIENT)
Dept: ADDICTION MEDICINE | Facility: CLINIC | Age: 49
End: 2019-03-13

## 2019-03-13 ENCOUNTER — COMMUNICATION - HEALTHEAST (OUTPATIENT)
Dept: FAMILY MEDICINE | Facility: CLINIC | Age: 49
End: 2019-03-13

## 2019-03-13 DIAGNOSIS — E11.9 DIABETES MELLITUS, TYPE 2 (H): ICD-10-CM

## 2019-03-13 DIAGNOSIS — B35.3 TINEA PEDIS OF BOTH FEET: ICD-10-CM

## 2019-03-22 ENCOUNTER — COMMUNICATION - HEALTHEAST (OUTPATIENT)
Dept: FAMILY MEDICINE | Facility: CLINIC | Age: 49
End: 2019-03-22

## 2019-03-22 ENCOUNTER — OFFICE VISIT - HEALTHEAST (OUTPATIENT)
Dept: FAMILY MEDICINE | Facility: CLINIC | Age: 49
End: 2019-03-22

## 2019-03-22 DIAGNOSIS — D06.9 CIN III (CERVICAL INTRAEPITHELIAL NEOPLASIA GRADE III) WITH SEVERE DYSPLASIA: ICD-10-CM

## 2019-03-22 DIAGNOSIS — G89.29 CHRONIC PAIN OF LEFT KNEE: ICD-10-CM

## 2019-03-22 DIAGNOSIS — N89.8 VAGINAL DISCHARGE: ICD-10-CM

## 2019-03-22 DIAGNOSIS — G89.29 OTHER CHRONIC PAIN: ICD-10-CM

## 2019-03-22 DIAGNOSIS — M54.16 LUMBAR RADICULOPATHY: ICD-10-CM

## 2019-03-22 DIAGNOSIS — F17.200 TOBACCO DEPENDENCE: ICD-10-CM

## 2019-03-22 DIAGNOSIS — E78.2 MIXED HYPERLIPIDEMIA: ICD-10-CM

## 2019-03-22 DIAGNOSIS — E11.49 TYPE 2 DIABETES MELLITUS WITH NEUROLOGICAL MANIFESTATIONS (H): ICD-10-CM

## 2019-03-22 DIAGNOSIS — F25.0 SCHIZOAFFECTIVE DISORDER, BIPOLAR TYPE (H): ICD-10-CM

## 2019-03-22 DIAGNOSIS — G56.03 BILATERAL CARPAL TUNNEL SYNDROME: ICD-10-CM

## 2019-03-22 DIAGNOSIS — G62.9 PERIPHERAL POLYNEUROPATHY: ICD-10-CM

## 2019-03-22 DIAGNOSIS — R87.610 ATYPICAL SQUAMOUS CELLS OF UNDETERMINED SIGNIFICANCE ON CYTOLOGIC SMEAR OF CERVIX (ASC-US): ICD-10-CM

## 2019-03-22 DIAGNOSIS — Z71.89 ADVANCED DIRECTIVES, COUNSELING/DISCUSSION: ICD-10-CM

## 2019-03-22 DIAGNOSIS — E03.9 HYPOTHYROIDISM, UNSPECIFIED TYPE: ICD-10-CM

## 2019-03-22 DIAGNOSIS — M25.562 CHRONIC PAIN OF LEFT KNEE: ICD-10-CM

## 2019-03-22 DIAGNOSIS — Z00.00 ROUTINE GENERAL MEDICAL EXAMINATION AT A HEALTH CARE FACILITY: ICD-10-CM

## 2019-03-22 LAB
CLUE CELLS: ABNORMAL
TRICHOMONAS, WET PREP: ABNORMAL
YEAST, WET PREP: ABNORMAL

## 2019-03-22 ASSESSMENT — MIFFLIN-ST. JEOR: SCORE: 1517.96

## 2019-03-23 ENCOUNTER — COMMUNICATION - HEALTHEAST (OUTPATIENT)
Dept: INTERNAL MEDICINE | Facility: CLINIC | Age: 49
End: 2019-03-23

## 2019-03-23 DIAGNOSIS — G89.4 CHRONIC PAIN SYNDROME: ICD-10-CM

## 2019-03-25 LAB
C TRACH DNA SPEC QL PROBE+SIG AMP: NEGATIVE
HPV SOURCE: ABNORMAL
HUMAN PAPILLOMA VIRUS 16 DNA: NEGATIVE
HUMAN PAPILLOMA VIRUS 18 DNA: NEGATIVE
HUMAN PAPILLOMA VIRUS FINAL DIAGNOSIS: ABNORMAL
HUMAN PAPILLOMA VIRUS OTHER HR: POSITIVE
N GONORRHOEA DNA SPEC QL NAA+PROBE: NEGATIVE
SPECIMEN DESCRIPTION: ABNORMAL

## 2019-04-01 ENCOUNTER — COMMUNICATION - HEALTHEAST (OUTPATIENT)
Dept: FAMILY MEDICINE | Facility: CLINIC | Age: 49
End: 2019-04-01

## 2019-04-01 ENCOUNTER — AMBULATORY - HEALTHEAST (OUTPATIENT)
Dept: FAMILY MEDICINE | Facility: CLINIC | Age: 49
End: 2019-04-01

## 2019-04-01 DIAGNOSIS — N76.0 BACTERIAL VAGINOSIS: ICD-10-CM

## 2019-04-01 DIAGNOSIS — B96.89 BACTERIAL VAGINOSIS: ICD-10-CM

## 2019-04-01 LAB
BKR LAB AP ABNORMAL BLEEDING: NO
BKR LAB AP BIRTH CONTROL/HORMONES: NORMAL
BKR LAB AP CERVICAL APPEARANCE: NORMAL
BKR LAB AP GYN ADEQUACY: NORMAL
BKR LAB AP GYN INTERPRETATION: NORMAL
BKR LAB AP GYN OTHER FINDINGS: NORMAL
BKR LAB AP HPV REFLEX: NORMAL
BKR LAB AP LMP: NORMAL
BKR LAB AP PATIENT STATUS: NORMAL
BKR LAB AP PREVIOUS ABNORMAL: NORMAL
BKR LAB AP PREVIOUS NORMAL: NORMAL
HIGH RISK?: NO
PATH REPORT.COMMENTS IMP SPEC: NORMAL
RESULT FLAG (HE HISTORICAL CONVERSION): NORMAL

## 2019-04-10 ENCOUNTER — COMMUNICATION - HEALTHEAST (OUTPATIENT)
Dept: FAMILY MEDICINE | Facility: CLINIC | Age: 49
End: 2019-04-10

## 2019-04-10 DIAGNOSIS — G89.29 OTHER CHRONIC PAIN: ICD-10-CM

## 2019-04-10 DIAGNOSIS — G47.00 PERSISTENT INSOMNIA: ICD-10-CM

## 2019-04-10 DIAGNOSIS — B35.3 TINEA PEDIS OF BOTH FEET: ICD-10-CM

## 2019-04-10 DIAGNOSIS — I10 HYPERTENSION WITH GOAL BLOOD PRESSURE LESS THAN 140/90: ICD-10-CM

## 2019-04-10 DIAGNOSIS — E11.49 TYPE 2 DIABETES MELLITUS WITH NEUROLOGICAL MANIFESTATIONS (H): ICD-10-CM

## 2019-04-10 DIAGNOSIS — G62.9 PERIPHERAL POLYNEUROPATHY: ICD-10-CM

## 2019-04-10 DIAGNOSIS — K29.70 GASTRITIS: ICD-10-CM

## 2019-04-10 DIAGNOSIS — G89.4 CHRONIC PAIN SYNDROME: ICD-10-CM

## 2019-04-18 ENCOUNTER — COMMUNICATION - HEALTHEAST (OUTPATIENT)
Dept: FAMILY MEDICINE | Facility: CLINIC | Age: 49
End: 2019-04-18

## 2019-04-19 ENCOUNTER — COMMUNICATION - HEALTHEAST (OUTPATIENT)
Dept: FAMILY MEDICINE | Facility: CLINIC | Age: 49
End: 2019-04-19

## 2019-04-24 ENCOUNTER — COMMUNICATION - HEALTHEAST (OUTPATIENT)
Dept: FAMILY MEDICINE | Facility: CLINIC | Age: 49
End: 2019-04-24

## 2019-04-24 DIAGNOSIS — G89.29 OTHER CHRONIC PAIN: ICD-10-CM

## 2019-04-24 DIAGNOSIS — G62.9 PERIPHERAL POLYNEUROPATHY: ICD-10-CM

## 2019-04-25 ENCOUNTER — COMMUNICATION - HEALTHEAST (OUTPATIENT)
Dept: FAMILY MEDICINE | Facility: CLINIC | Age: 49
End: 2019-04-25

## 2019-04-29 ENCOUNTER — COMMUNICATION - HEALTHEAST (OUTPATIENT)
Dept: FAMILY MEDICINE | Facility: CLINIC | Age: 49
End: 2019-04-29

## 2019-05-03 ENCOUNTER — COMMUNICATION - HEALTHEAST (OUTPATIENT)
Dept: FAMILY MEDICINE | Facility: CLINIC | Age: 49
End: 2019-05-03

## 2019-05-14 ENCOUNTER — COMMUNICATION - HEALTHEAST (OUTPATIENT)
Dept: FAMILY MEDICINE | Facility: CLINIC | Age: 49
End: 2019-05-14

## 2019-05-14 ENCOUNTER — RECORDS - HEALTHEAST (OUTPATIENT)
Dept: ADMINISTRATIVE | Facility: OTHER | Age: 49
End: 2019-05-14

## 2019-05-14 ENCOUNTER — HOSPITAL ENCOUNTER (OUTPATIENT)
Dept: PALLIATIVE MEDICINE | Facility: OTHER | Age: 49
Discharge: HOME | End: 2019-05-14
Attending: FAMILY MEDICINE

## 2019-05-14 DIAGNOSIS — G89.4 CHRONIC PAIN SYNDROME: ICD-10-CM

## 2019-05-14 DIAGNOSIS — G62.9 PERIPHERAL POLYNEUROPATHY: ICD-10-CM

## 2019-05-14 DIAGNOSIS — G89.29 OTHER CHRONIC PAIN: ICD-10-CM

## 2019-05-14 ASSESSMENT — MIFFLIN-ST. JEOR: SCORE: 1517.96

## 2019-05-15 ENCOUNTER — COMMUNICATION - HEALTHEAST (OUTPATIENT)
Dept: FAMILY MEDICINE | Facility: CLINIC | Age: 49
End: 2019-05-15

## 2019-05-17 ENCOUNTER — COMMUNICATION - HEALTHEAST (OUTPATIENT)
Dept: FAMILY MEDICINE | Facility: CLINIC | Age: 49
End: 2019-05-17

## 2019-05-17 ENCOUNTER — AMBULATORY - HEALTHEAST (OUTPATIENT)
Dept: FAMILY MEDICINE | Facility: CLINIC | Age: 49
End: 2019-05-17

## 2019-05-17 DIAGNOSIS — B37.31 VAGINAL CANDIDIASIS: ICD-10-CM

## 2019-10-10 ENCOUNTER — AMBULATORY - HEALTHEAST (OUTPATIENT)
Dept: PALLIATIVE MEDICINE | Facility: OTHER | Age: 49
End: 2019-10-10

## 2019-12-23 ENCOUNTER — COMMUNICATION - HEALTHEAST (OUTPATIENT)
Dept: PALLIATIVE MEDICINE | Facility: OTHER | Age: 49
End: 2019-12-23

## 2020-01-23 ENCOUNTER — RECORDS - HEALTHEAST (OUTPATIENT)
Dept: ADMINISTRATIVE | Facility: OTHER | Age: 50
End: 2020-01-23

## 2020-02-17 ENCOUNTER — HOSPITAL ENCOUNTER (OUTPATIENT)
Dept: PALLIATIVE MEDICINE | Facility: OTHER | Age: 50
Discharge: HOME | End: 2020-02-17
Attending: ANESTHESIOLOGY

## 2020-02-17 DIAGNOSIS — G89.4 CHRONIC PAIN SYNDROME: ICD-10-CM

## 2020-02-17 DIAGNOSIS — G62.9 PERIPHERAL POLYNEUROPATHY: ICD-10-CM

## 2020-02-17 DIAGNOSIS — G89.29 OTHER CHRONIC PAIN: ICD-10-CM

## 2020-02-17 ASSESSMENT — MIFFLIN-ST. JEOR: SCORE: 1498.12

## 2020-02-18 LAB — 25(OH)D3 SERPL-MCNC: 5.6 NG/ML (ref 30–80)

## 2020-02-20 ENCOUNTER — OFFICE VISIT - HEALTHEAST (OUTPATIENT)
Dept: PALLIATIVE MEDICINE | Facility: OTHER | Age: 50
End: 2020-02-20

## 2020-02-20 DIAGNOSIS — M54.16 LUMBAR RADICULOPATHY: ICD-10-CM

## 2020-02-20 LAB
DQA1*LOCUS: NORMAL
DQB1* LOCUS: NORMAL
DRSSO COMMENTS: NORMAL
DRSSO TEST METHOD: NORMAL
GLIADIN IGA SER-ACNC: 1.7 U/ML
GLIADIN IGG SER-ACNC: <0.4 U/ML
IGA SERPL-MCNC: 148 MG/DL (ref 65–400)
TTG IGA SER-ACNC: 0.3 U/ML
TTG IGG SER-ACNC: <0.6 U/ML

## 2020-03-05 ENCOUNTER — AMBULATORY - HEALTHEAST (OUTPATIENT)
Dept: PALLIATIVE MEDICINE | Facility: OTHER | Age: 50
End: 2020-03-05

## 2020-03-10 ENCOUNTER — HEALTH MAINTENANCE LETTER (OUTPATIENT)
Age: 50
End: 2020-03-10

## 2020-04-29 ENCOUNTER — HOSPITAL ENCOUNTER (OUTPATIENT)
Dept: PALLIATIVE MEDICINE | Facility: OTHER | Age: 50
Discharge: HOME | End: 2020-04-29
Attending: ANESTHESIOLOGY

## 2020-04-29 DIAGNOSIS — G89.4 CHRONIC PAIN SYNDROME: ICD-10-CM

## 2020-04-30 ENCOUNTER — COMMUNICATION - HEALTHEAST (OUTPATIENT)
Dept: PALLIATIVE MEDICINE | Facility: OTHER | Age: 50
End: 2020-04-30

## 2020-04-30 DIAGNOSIS — G89.29 OTHER CHRONIC PAIN: ICD-10-CM

## 2020-05-04 ENCOUNTER — AMBULATORY - HEALTHEAST (OUTPATIENT)
Dept: CARE COORDINATION | Facility: CLINIC | Age: 50
End: 2020-05-04

## 2020-05-04 DIAGNOSIS — Z78.9 HEALTH CARE HOME, ACTIVE CARE COORDINATION: ICD-10-CM

## 2020-05-05 ENCOUNTER — COMMUNICATION - HEALTHEAST (OUTPATIENT)
Dept: NURSING | Facility: CLINIC | Age: 50
End: 2020-05-05

## 2020-05-13 ENCOUNTER — COMMUNICATION - HEALTHEAST (OUTPATIENT)
Dept: NURSING | Facility: CLINIC | Age: 50
End: 2020-05-13

## 2020-05-26 ENCOUNTER — COMMUNICATION - HEALTHEAST (OUTPATIENT)
Dept: FAMILY MEDICINE | Facility: CLINIC | Age: 50
End: 2020-05-26

## 2020-05-26 DIAGNOSIS — F32.A CHRONIC DEPRESSION: ICD-10-CM

## 2020-05-26 DIAGNOSIS — B35.3 TINEA PEDIS OF BOTH FEET: ICD-10-CM

## 2020-05-26 DIAGNOSIS — E11.49 TYPE 2 DIABETES MELLITUS WITH NEUROLOGICAL MANIFESTATIONS (H): ICD-10-CM

## 2020-07-10 ENCOUNTER — COMMUNICATION - HEALTHEAST (OUTPATIENT)
Dept: PALLIATIVE MEDICINE | Facility: OTHER | Age: 50
End: 2020-07-10

## 2020-07-10 DIAGNOSIS — G62.9 PERIPHERAL POLYNEUROPATHY: ICD-10-CM

## 2020-07-24 ENCOUNTER — RECORDS - HEALTHEAST (OUTPATIENT)
Dept: ADMINISTRATIVE | Facility: OTHER | Age: 50
End: 2020-07-24

## 2020-07-28 ENCOUNTER — HOSPITAL ENCOUNTER (OUTPATIENT)
Dept: PALLIATIVE MEDICINE | Facility: OTHER | Age: 50
Discharge: HOME | End: 2020-07-28
Attending: ANESTHESIOLOGY

## 2020-07-28 DIAGNOSIS — G89.4 CHRONIC PAIN SYNDROME: ICD-10-CM

## 2020-12-27 ENCOUNTER — HEALTH MAINTENANCE LETTER (OUTPATIENT)
Age: 50
End: 2020-12-27

## 2021-04-24 ENCOUNTER — HEALTH MAINTENANCE LETTER (OUTPATIENT)
Age: 51
End: 2021-04-24

## 2021-06-05 ENCOUNTER — RECORDS - HEALTHEAST (OUTPATIENT)
Dept: ADMINISTRATIVE | Facility: CLINIC | Age: 51
End: 2021-06-05

## 2021-06-19 ENCOUNTER — HEALTH MAINTENANCE LETTER (OUTPATIENT)
Age: 51
End: 2021-06-19

## 2021-07-20 VITALS — BODY MASS INDEX: 32.32 KG/M2 | WEIGHT: 194 LBS | HEIGHT: 65 IN

## 2021-07-20 VITALS
WEIGHT: 208.1 LBS | BODY MASS INDEX: 35.53 KG/M2 | BODY MASS INDEX: 34.91 KG/M2 | HEIGHT: 64 IN | HEIGHT: 64 IN | WEIGHT: 204.5 LBS

## 2021-07-20 VITALS — HEIGHT: 64 IN | WEIGHT: 198 LBS | BODY MASS INDEX: 33.8 KG/M2

## 2021-07-20 VITALS — HEIGHT: 64 IN | BODY MASS INDEX: 33.99 KG/M2

## 2021-07-20 VITALS — HEIGHT: 64 IN | BODY MASS INDEX: 38.29 KG/M2 | WEIGHT: 224.3 LBS

## 2021-07-20 VITALS — BODY MASS INDEX: 32.2 KG/M2 | WEIGHT: 195 LBS

## 2021-07-20 VITALS — WEIGHT: 198 LBS | HEIGHT: 65 IN | BODY MASS INDEX: 32.99 KG/M2

## 2021-07-20 VITALS — BODY MASS INDEX: 32.99 KG/M2 | HEIGHT: 65 IN | WEIGHT: 198 LBS

## 2021-07-20 VITALS — BODY MASS INDEX: 33.06 KG/M2 | WEIGHT: 200.2 LBS

## 2021-07-20 PROBLEM — I10 HYPERTENSION WITH GOAL BLOOD PRESSURE LESS THAN 140/90: Status: ACTIVE | Noted: 2017-04-28

## 2021-07-20 PROBLEM — G56.03 BILATERAL CARPAL TUNNEL SYNDROME: Status: ACTIVE | Noted: 2019-03-22

## 2021-07-20 PROBLEM — G62.9 PERIPHERAL NEUROPATHY: Status: ACTIVE | Noted: 2017-04-27

## 2021-07-20 PROBLEM — J30.89 ENVIRONMENTAL AND SEASONAL ALLERGIES: Status: ACTIVE | Noted: 2017-04-28

## 2021-07-20 PROBLEM — Z71.89 ADVANCED DIRECTIVES, COUNSELING/DISCUSSION: Status: ACTIVE | Noted: 2019-03-22

## 2021-07-20 PROBLEM — F17.200 TOBACCO DEPENDENCE: Status: ACTIVE | Noted: 2017-04-27

## 2021-07-20 PROBLEM — I49.9 IRREGULAR HEARTBEAT: Status: ACTIVE | Noted: 2018-04-18

## 2021-07-20 PROBLEM — R45.851 SUICIDAL IDEATION: Status: ACTIVE | Noted: 2019-08-04

## 2021-07-20 PROBLEM — F29 PSYCHOSIS, UNSPECIFIED PSYCHOSIS TYPE (H): Status: ACTIVE | Noted: 2019-08-04

## 2021-07-20 PROBLEM — R87.619 ABNORMAL PAP SMEAR OF CERVIX: Status: ACTIVE | Noted: 2018-04-23

## 2021-07-20 PROBLEM — M51.379 DDD (DEGENERATIVE DISC DISEASE), LUMBOSACRAL: Status: ACTIVE | Noted: 2018-04-11

## 2021-07-20 PROBLEM — F14.90 COCAINE USE: Status: ACTIVE | Noted: 2018-04-11

## 2021-07-20 NOTE — TELEPHONE ENCOUNTER
Central PA team  [phone]  Pool: HE PA MED ([id number])          PA has been initiated.       PA form completed and faxed insurance via Cover My Meds     Key:  [id number]     Medication:  Namenda XR Titration Pack 7 & 14 & 21 &28MG er capsules    Insurance:  [employer]         Response will be received via fax and may take up to 5-10 business days depending on plan

## 2021-07-20 NOTE — ADDENDUM NOTE
Addendum Note by Katty De La Torre LPN at 4/29/2019  3:03 PM     Author: Katty De La Torre LPN Service: -- Author Type: Licensed Nurse    Filed: 4/29/2019  3:03 PM Encounter Date: 4/24/2019 Status: Signed    : Katty De La Torre LPN (Licensed Nurse)    Addended by: KATTY DE LA TORRE on: 4/29/2019 03:03 PM        Modules accepted: Orders

## 2021-07-20 NOTE — TELEPHONE ENCOUNTER
I have been trying to contact patient - I really need to review the entire list of medications with patient - there are medications on list the list that were last refilled April 2018 with only 4 months of refills - it is impossible to know what she is really taking.     I have been unable to reach her on either phone #.

## 2021-07-20 NOTE — PROGRESS NOTES
"Progress Notes by Rose Joy LPN at 4/29/2020  3:00 PM     Author: Rose Joy LPN Service: -- Author Type: Licensed Nurse    Filed: 4/29/2020  5:30 PM Date of Service: 4/29/2020  3:00 PM Status: Signed    : Rose Joy LPN (Licensed Nurse)       Lorie Vann is a 49 y.o. female who is being evaluated via a billable telephone visit.      The patient has been notified of following:     \"This telephone visit will be conducted via a call between you and your physician/provider. We have found that certain health care needs can be provided without the need for a physical exam.  This service lets us provide the care you need with a short phone conversation.  If a prescription is necessary we can send it directly to your pharmacy.  If lab work is needed we can place an order for that and you can then stop by our lab to have the test done at a later time.    Telephone visits are billed at different rates depending on your insurance coverage. During this emergency period, for some insurers they may be billed the same as an in-person visit.  Please reach out to your insurance provider with any questions.    If during the course of the call the physician/provider feels a telephone visit is not appropriate, you will not be charged for this service.\"    Patient has given verbal consent to a Telephone visit? Yes    Patient would like to receive their AVS by AVS Preference: Jeanmarie.    Patient is here for a follow up appointment with Dr. Alba. Patient has back, bilateral legs and neuropathy pain, describes the pain as constant, throbbing, stabbing, sharp,rates the pain as 10/10. CSA, SHEILA, NDI, UDT are deferred due to COVID 19. Functionality is 7. Patient states her pain interferes with her sleep, walking, work, and ADL's.         Rose Joy LPN           "

## 2021-07-20 NOTE — TELEPHONE ENCOUNTER
Patient left writer voicemail stating she would not be attending group today. Reason not given during message.

## 2021-07-20 NOTE — PROGRESS NOTES
"Clinic Care Coordination Contact  [facility]/Voicemail    Reason: [id number] - Ambulatory referral to Care Management (Primary Care).   NOTE: please see the \"Note\" or comments section attached with the referral for more info if need.     Clinical Data: Care Coordinator Outreach  Outreach attempted x 1. Left message on patient's voicemail with call back information and requested return call.  Plan: Care Coordinator will try to reach patient again in 1-2 business days.        "

## 2021-07-20 NOTE — LETTER
Letter by India Forde MD at      Author: India Forde MD Service: -- Author Type: --    Filed:  Encounter Date: 4/25/2019 Status: (Other)         04/29/19    RE: Lorie QUIÑONEZ Edwar  1970    To Whom It May Concern:    Please provide the following items for this patient:   1.  Compression Stockings (2 pair - will need to be measured for size)    Diagnosis :  Peripheral Neuropathy G62.9                        Diabetic polyneuropathy    E11.42   2.  Bilateral short wrist braces    Diagnosis:  Bilateral Carpal Tunnel Syndrome    G56.03   3.  Shower Chair    Diagnosis:  Chronic pain syndrome   G89.29                                Lumbar radiculopathy    M54.16            Lumbosacral degenerative disc disease    M51.37    Thank you for your attention to this matter.     Sincerely,        India Forde MD

## 2021-07-20 NOTE — TELEPHONE ENCOUNTER
Medication Request  Medication name: Medication for Yeast infection -- See Below  Pharmacy Name and Location: [facility] on [address] and [address]  Reason for request: Last Rx didn't work.  When did you use medication last?:  Last month  Patient offered appointment:    Okay to leave a detailed message: yes    Patient was in last month was given a Rx for a medication for a yeast infection. Patient states that it didin't work. Wants a different medication.

## 2021-07-20 NOTE — TELEPHONE ENCOUNTER
JOYCE Short can you please see below message and prescribe different medication for patient if comfortable doing so? Thank you.

## 2021-07-20 NOTE — LETTER
Letter by Latisha Jackson CHW at      Author: Latisha Jackson CHW Service: -- Author Type: --    Filed:  Encounter Date: 5/13/2020 Status: (Other)       CARE COORDINATION  [facility] [address] [place] [address]  May 13, 2020    Lorie Vann  [address] [place] [address]      Dear Lorie,    I am a clinic community health worker  who works with India Forde MD at [facility]. I wanted to introduce myself and provide you with my contact information for you to be able to call me with any questions or concerns. Below is a description of clinic care coordination and how I can further assist you.      The clinic care coordination team is made up of a registered nurse,  and community health worker who understand the health care system. The goal of clinic care coordination is to help you manage your health and improve access to the health care system in the most efficient manner. The team can assist you in meeting your health care goals by providing education, coordinating services, strengthening the communication among your providers and supporting you with any resource needs.    Please feel free to contact the Community Health Worker at [phone] with any questions or concerns. We are focused on providing you with the highest-quality healthcare experience possible and that all starts with you.     Sincerely,     MAYITO Reilly

## 2021-07-20 NOTE — TELEPHONE ENCOUNTER
Controlled Substance Refill Request  Medication:   Requested Prescriptions     Pending Prescriptions Disp Refills     traMADol (ULTRAM) 50 mg tablet 30 tablet 0     Date Last Fill: 7/17/17  Pharmacy: [id number] - [place], [place] - [address] [place] [address] AT SEC OF [facility]   Print RX for patient to  at   Controlled Substance Agreement on File:   Encounter-Level CSA Scan Date:    There are no encounter-level csa scan date.       Last office visit: Last office visit pertaining to requested medication was 5/29/18.  Kayli Hernandez RN, MA  [facility] Care Connection RN Triage

## 2021-07-20 NOTE — TELEPHONE ENCOUNTER
RN cannot approve Refill Request    RN can NOT refill this medication med is not covered by policy/route to provider. Last office visit: 5/29/2018 India Forde MD Last Physical: Visit date not found Last MTM visit: Visit date not found Last visit same specialty: 10/25/2018 Prabhjot Mckeon MD.  Next visit within 3 mo: Visit date not found  Next physical within 3 mo: Visit date not found      Galindo Varner, [facility] Connection Triage/Med Refill 3/17/2019    Requested Prescriptions   Pending Prescriptions Disp Refills     ibuprofen (ADVIL,MOTRIN) 800 MG tablet 60 tablet 0     Sig: Take 1 tablet (800 mg total) by mouth every 8 (eight) hours as needed for pain or fever. Take with food.    There is no refill protocol information for this order        terbinafine HCl (LAMISIL) 1 % cream 45 g 0     Sig: Apply topically to affected areas BID    There is no refill protocol information for this order      Signed Prescriptions Disp Refills     blood glucose meter (GLUCOMETER) 1 each 0     Sig: Use 1 each As Directed as needed. Dispense glucometer brand per patient's insurance at pharmacy discretion.    Diabetic Supplies Refill Protocol Passed - 3/13/2019  1:44 PM       Passed - Visit with PCP or prescribing provider visit in last 6 months    Last office visit with prescriber/PCP: 5/29/2018 India Forde MD OR same dept: 10/25/2018 Prabhjot Mckeon MD OR same specialty: 10/25/2018 Prabhjot Mckeon MD  Last physical: Visit date not found Last MTM visit: Visit date not found   Next visit within 3 mo: Visit date not found  Next physical within 3 mo: Visit date not found  Prescriber OR PCP: India Forde MD  Last diagnosis associated with med order: 1. Diabetes mellitus, type 2 (H)  - blood glucose meter (GLUCOMETER); Use 1 each As Directed as needed. Dispense glucometer brand per patient's insurance at pharmacy discretion.  Dispense: 1 each; Refill: 0    2. Tinea pedis of both  feet  - terbinafine HCl (LAMISIL) 1 % cream; Apply topically to affected areas BID  Dispense: 45 g; Refill: 0    If protocol passes may refill for 12 months if within 3 months of last provider visit (or a total of 15 months).            Passed - A1C in last 6 months    Hemoglobin A1c   Date Value Ref Range Status   10/25/2018 6.4 (H) 3.5 - 6.0 % Final

## 2021-07-20 NOTE — PROGRESS NOTES
ASSESSMENT/PLAN:  1. Schizoaffective disorder, bipolar type  Glycosylated Hemoglobin A1c    Basic Metabolic Panel   2. Type 2 diabetes mellitus with neurological manifestations  dulaglutide (TRULICITY) 1.5 mg/0.5 mL PnIj    sitaGLIPtin (JANUVIA) 100 MG tablet   3. Chronic depression  divalproex (DEPAKOTE) 250 MG EC tablet    QUEtiapine (SEROQUEL) 100 MG tablet    traZODone (DESYREL) 100 MG tablet   4. Mixed hyperlipidemia  Hepatic Profile    Lipid Profile   5. Hypothyroidism  Thyroid Cascade   6. Hypertension with goal blood pressure less than 140/90  lisinopril (PRINIVIL,ZESTRIL) 20 MG tablet   7. Vaginal itching  Wet Prep, Vaginal    fluconazole (DIFLUCAN) 150 MG tablet   8. Visit for screening mammogram  Mammo Screening Bilateral   9. Urinary frequency  Urinalysis    Culture, Urine   10. Tinea pedis of both feet  terbinafine HCl (LAMISIL) 1 % cream       This is a 48 yo female here to establish care with me.  She has:  1.  Schizoaffective disorder/depression - it is not clear if she is currently under the care of any mental health professional.  We discussed this at length.  Would encourage regular follow up.  Wpuld continue same medications for now.  2.  Type II DM  - using Trulicity, Januvia and Glipizide.  Reports good blood sugar control - would check labs.  3.  Hypertension - BP stable at 100/64.  Continue same treatment.  4.  Vaginal itching - likely yeast - wet prep is negative, but would treat with Fluconazole.  5.  Hyperlipidemia - check lipids  6.  Hypothyroidism - check labs  7.  Urinary Frequency - check UA/UC - treat only if culture positive  8.  Tinea pedis - bilateral feet - will treat with Terbinafine cream  9.  Health Maintenance - mammogram referral        Medications Discontinued During This Encounter   Medication Reason     dulaglutide (TRULICITY) 1.5 mg/0.5 mL PnIj Reorder     fluticasone (FLONASE) 50 mcg/actuation nasal spray Reorder     divalproex (DEPAKOTE) 250 MG EC tablet Reorder      "lisinopril (PRINIVIL,ZESTRIL) 20 MG tablet Reorder     QUEtiapine (SEROQUEL) 100 MG tablet Reorder     sitaGLIPtin (JANUVIA) 100 MG tablet Reorder     traZODone (DESYREL) 100 MG tablet Reorder     There are no Patient Instructions on file for this visit.    Chief Complaint:  Chief Complaint   Patient presents with     Vaginitis     Medication Refill       HPI:   Lorie Vann is a 47 y.o. female c/o  Vaginal infection  Had been in correction - argued with her daughter  Charged with making terroristic threats - in correction 30+ days  Now back out   8 daughters / 3 sons - oldest son 34, oldest daughter 33, youngest son 11, youngest daughter 16; 36 grandkids  From [place]    Diabetes is \"controlled\"      PMH:   Patient Active Problem List    Diagnosis Date Noted     Abnormal Pap smear of cervix 04/23/2018     Irregular heartbeat 04/18/2018     Cocaine use 04/11/2018     DDD (degenerative disc disease), lumbosacral 04/11/2018     Hypertension with goal blood pressure less than 140/90 04/28/2017     Environmental and seasonal allergies 04/28/2017     Tobacco dependence 04/27/2017     Peripheral neuropathy 04/27/2017     Schizoaffective disorder, bipolar type 10/06/2015     Chronic depression 11/29/2014     ANGELINE III (cervical intraepithelial neoplasia grade III) with severe dysplasia 08/02/2014     Syphilis 07/09/2014     Mixed hyperlipidemia      Chronic Pain      Lumbar radiculopathy      Microalbuminuria 01/27/2014     Type 2 diabetes mellitus with neurological manifestations 12/11/2010     Hypothyroidism 03/11/2010     Mixed, or nondependent drug abuse, in remission 03/11/2010     Past Medical History:   Diagnosis Date     Anemia      Carpal tunnel syndrome 10/20/2009     Cervical cancer      Chronic headaches      Cocaine abuse in remission      Depression      History of psychiatric disorder     requiring hospitalization     Optic neuritis 2/23/2011    Overview:  Onset: 2/2011 ; Optic Neuritis  NOS      Past Surgical History: "   Procedure Laterality Date     APPENDECTOMY  03/06/2014     CERVICAL CONIZATION   W/ LASER  2012, 2014     Social History     Social History     Marital status: Single     Spouse name: N/A     Number of children: 10     Years of education: N/A     Occupational History     Not on file.     Social History Main Topics     Smoking status: Current Some Day Smoker     Types: Cigarettes     Last attempt to quit: 9/21/2015     Smokeless tobacco: Never Used      Comment: 1 pack a week     Alcohol use No     Drug use: No     Sexual activity: Yes     Partners: Male     Other Topics Concern     Not on file     Social History Narrative    She walks, swims, and weight lifts 2x weekly. Volunteers for [employer], [employer], and [employer]. She has 3 male children between the ages of 10-31 and 7 female children between the ages of 16-30.        Meds:    Current Outpatient Prescriptions:      acetaminophen (TYLENOL) 325 MG tablet, Take 1 tablet (325 mg total) by mouth 4 (four) times a day., Disp: 120 tablet, Rfl: 0     aspirin 81 MG EC tablet, Take 1 tablet (81 mg total) by mouth daily., Disp: 30 tablet, Rfl: 11     atorvastatin (LIPITOR) 20 MG tablet, Take 1 tablet (20 mg total) by mouth daily., Disp: 30 tablet, Rfl: 3     blood glucose meter (GLUCOMETER), Use 1 each As Directed as needed. Dispense glucometer brand per patient's insurance at pharmacy discretion., Disp: 1 each, Rfl: 0     blood glucose test (ACCU-CHEK SMARTVIEW TEST STRIP) strips, Use 1 each As Directed 3 (three) times a day. Dispense brand per patient's insurance at pharmacy discretion., Disp: 100 each, Rfl: 11     cetirizine (ZYRTEC) 10 MG tablet, Take 1 tablet (10 mg total) by mouth daily., Disp: , Rfl: 0     divalproex (DEPAKOTE) 250 MG EC tablet, Take 1 tablet (250 mg total) by mouth 3 (three) times a day., Disp: 180 tablet, Rfl: 0     dulaglutide (TRULICITY) 1.5 mg/0.5 mL PnIj, Inject 1.5 mg under the skin once a week., Disp: 4 Syringe, Rfl: 3     fluticasone  (FLONASE) 50 mcg/actuation nasal spray, 2 sprays into each nostril daily., Disp: 16 g, Rfl: 0     gabapentin (NEURONTIN) 300 MG capsule, Take 900 mg by mouth., Disp: , Rfl:      generic lancets, 3 times daily. Test 3 times per day., Disp: , Rfl:      ibuprofen (ADVIL,MOTRIN) 800 MG tablet, Take 1 tablet (800 mg total) by mouth every 8 (eight) hours as needed for pain or fever. Take with food., Disp: 60 tablet, Rfl: 0     lisinopril (PRINIVIL,ZESTRIL) 20 MG tablet, Take 20 mg by mouth., Disp: , Rfl:      lisinopril (PRINIVIL,ZESTRIL) 20 MG tablet, Take 1 tablet (20 mg total) by mouth daily., Disp: 30 tablet, Rfl: 3     omeprazole (PRILOSEC) 20 MG capsule, Take 20 mg by mouth daily., Disp: , Rfl:      QUEtiapine (SEROQUEL) 100 MG tablet, Take 100 mg by mouth., Disp: , Rfl:      QUEtiapine (SEROQUEL) 100 MG tablet, Take 1 tablet (100 mg total) by mouth 2 (two) times a day., Disp: 60 tablet, Rfl: 0     sitaGLIPtin (JANUVIA) 100 MG tablet, Take 1 tablet (100 mg total) by mouth daily., Disp: 30 tablet, Rfl: 3     traMADol (ULTRAM) 50 mg tablet, TAKE 1 TABLET(50 MG) BY MOUTH DAILY AS NEEDED, Disp: 30 tablet, Rfl: 0     traZODone (DESYREL) 100 MG tablet, Take 100 mg by mouth., Disp: , Rfl:      traZODone (DESYREL) 100 MG tablet, Take 1 tablet (100 mg total) by mouth at bedtime., Disp: 90 tablet, Rfl: 0     dulaglutide 1.5 mg/0.5 mL PnIj, Inject 1.5 mg under the skin once a week., Disp: , Rfl:      gabapentin (NEURONTIN) 300 MG capsule, TAKE 3 CAPSULES BY MOUTH THREE TIMES DAILY, Disp: 270 capsule, Rfl: 3     glipiZIDE (GLUCOTROL) 10 MG tablet, Take 10 mg by mouth daily., Disp: , Rfl:      terbinafine HCl (LAMISIL) 1 % cream, Apply topically to affected areas BID, Disp: 45 g, Rfl: 0    Allergies:  No Known Allergies    ROS:  Pertinent positives as noted in HPI; otherwise 12 point ROS negative.      Physical Exam:  EXAM:  /64 (Patient Site: Left Arm, Patient Position: Sitting, Cuff Size: Adult Large)  Pulse 94  Temp  98.2  F (36.8  C) (Oral)   Resp 14  Wt 200 lb 3.2 oz (90.8 kg)  SpO2 97%  BMI 33.06 kg/m2   Gen:  NAD, appears well, well-hydrated  HEENT:  TMs nl, oropharynx benign, nasal mucosa nl, conjunctiva clear  Neck:  Supple, no adenopathy, no thyromegaly, no carotid bruits, no JVD  Lungs:  Clear to auscultation bilaterally  Cor:  RRR no murmur  Abd:  Soft, nontender, BS+, no masses, no guarding or rebound, no HSM  PELVIC EXAM:External genitalia: normal  Vaginal mucosa normal  Vaginal discharge: thick white  Speculum exam shows a normal appearing cervix .   Bimanual exam: Cervix closed, firm, non tender  to motion.  Uterus  firm, regular, mobile, non tender to palpation. No adnexal masses or tenderness.   Extr:  Neg.  Feet:  Mod deformity , some pes planus; no lesions, some callus, pulses palpable; sensation sl diminished  Neuro:  No asymmetry  Skin:  Warm/dry        Results:  Results for orders placed or performed in visit on 05/29/18   Wet Prep, Vaginal   Result Value Ref Range    Yeast Result No yeast seen No yeast seen    Trichomonas No Trichomonas seen No Trichomonas seen    Clue Cells, Wet Prep No Clue cells seen No Clue cells seen   Culture, Urine   Result Value Ref Range    Culture No Growth    Glycosylated Hemoglobin A1c   Result Value Ref Range    Hemoglobin A1c 6.8 (H) 3.5 - 6.0 %   Basic Metabolic Panel   Result Value Ref Range    Sodium 142 136 - 145 mmol/L    Potassium 4.3 3.5 - 5.0 mmol/L    Chloride 108 (H) 98 - 107 mmol/L    CO2 23 22 - 31 mmol/L    Anion Gap, Calculation 11 5 - 18 mmol/L    Glucose 84 70 - 125 mg/dL    Calcium 10.0 8.5 - 10.5 mg/dL    BUN 24 (H) 8 - 22 mg/dL    Creatinine 0.80 0.60 - 1.10 mg/dL    GFR MDRD Af Amer >60 >60 mL/min/1.73m2    GFR MDRD Non Af Amer >60 >60 mL/min/1.73m2   Hepatic Profile   Result Value Ref Range    Bilirubin, Total 0.3 0.0 - 1.0 mg/dL    Bilirubin, Direct <0.1 <=0.5 mg/dL    Protein, Total 6.5 6.0 - 8.0 g/dL    Albumin 3.9 3.5 - 5.0 g/dL    Alkaline  Phosphatase 83 45 - 120 U/L    AST 13 0 - 40 U/L    ALT 14 0 - 45 U/L   Thyroid Cascade   Result Value Ref Range    TSH 0.41 0.30 - 5.00 uIU/mL   Lipid Profile   Result Value Ref Range    Triglycerides 201 (H) <=149 mg/dL    Cholesterol 212 (H) <=199 mg/dL    LDL Calculated 119 <=129 mg/dL    HDL Cholesterol 53 >=50 mg/dL    Patient Fasting > 8hrs? Yes    Urinalysis   Result Value Ref Range    Color, UA Yellow Colorless, Yellow, Straw, Light Yellow    Clarity, UA Clear Clear    Glucose, UA Negative Negative    Bilirubin, UA Negative Negative    Ketones, UA Negative Negative    Specific Gravity, UA >=1.030 1.005 - 1.030    Blood, UA Negative Negative    pH, UA 5.0 5.0 - 8.0    Protein, UA Negative Negative mg/dL    Urobilinogen, UA 0.2 E.U./dL 0.2 E.U./dL, 1.0 E.U./dL    Nitrite, UA Negative Negative    Leukocytes, UA Negative Negative

## 2021-07-20 NOTE — PATIENT INSTRUCTIONS - HE
PLAN:     Sign ZIGGY for Dr. Alba to speak to therapist      Come back Thursday for labs    Do not  Gabapentin    Discussed starting Lamictal for nerve pain 25mg one daily for two weeks, one twice a day for two weeks, then two morning and one bedtime    Referral to Physical therapy for learning TENS unit    Return in 4-6 weeks

## 2021-07-20 NOTE — PROGRESS NOTES
[facility] Note    Lorie Vann   46 y.o. female    Date of Visit: 6/6/2017  Chief Complaint   Patient presents with     rotator cuff painful     Right. No injury     Does not want Diabetes addressed today       ASSESSMENT/PLAN  1. Incomplete tear of right rotator cuff  Ambulatory referral to Orthopedics   2. Diabetes mellitus  Glycosylated Hemoglobin A1c    Microalbumin, Random Urine     ---------------------------------------------    Ms. Vann is a 46-year-old woman who presents for follow-up on shoulder MRI results from [facility].  I discussed these with her by phone, since she had to leave for another appointment early.  She has partial tear in the supraspinatus tendon as well as other subscapularis tendinopathy, and spurring of the acromion.  I recommended referral to [facility] to see a shoulder specialist, refer to Dr. Jessie Matamoros    Follow-up soon for diabetes    Refill tramadol.  [place] prescription monitoring program reviewed.    Return in about 1 week (around 6/13/2017).      SUBJECTIVE  Lorie Vann is a 46-year-old woman who presents for follow-up on diabetes as well as recent [facility] visit on 5/11/17 and to get results of MRI of the shoulder.  She was seen in the [facility] by Christin Newton PA-C, and MRI was performed to evaluate for rotator cuff tear.  It was noted that Lorie would get a phone call when the results were in.  When she did call, she was directed to this clinic.  Results were not available to me at the time of this visit, though Ms. Vann decided to reschedule instead of waiting for the result to return.  She also deferred diabetes follow-up until that next visit coming up.  The fax did return, and as able to reach her by telephone and relayed the information of partial right supraspinatus tear as well as moderate severity subscapularis and supraspinatus tendinopathy.  There is a spur off the acromion which could predispose to  impingement.  No fractures of the bones.    ROS A comprehensive review of systems was performed and was otherwise negative    Medications, allergies, and problem list were reviewed and updated    Patient Active Problem List   Diagnosis     Hyperlipidemia     Bipolar Disorder     Chronic Pain     Type 2 diabetes mellitus with diabetic polyneuropathy, without long-term current use of insulin     Arthritis     Lumbar radiculopathy     Chronic depression     Tobacco dependence     Demyelinating disease     Hypertension with goal blood pressure less than 140/90     Environmental and seasonal allergies     Acute pain of right shoulder     Past Medical History:   Diagnosis Date     Anemia      Cervical cancer      Chronic headaches      Cocaine abuse in remission      Depression      History of psychiatric disorder     requiring hospitalization     No past surgical history on file.  Social History     Social History     Marital status: Single     Spouse name: N/A     Number of children: 10     Years of education: N/A     Occupational History     Not on file.     Social History Main Topics     Smoking status: Current Some Day Smoker     Types: Cigarettes     Last attempt to quit: 9/21/2015     Smokeless tobacco: Not on file      Comment: 1 pack a week     Alcohol use No     Drug use: No     Sexual activity: Yes     Partners: Male     Other Topics Concern     Not on file     Social History Narrative    She walks, swims, and weight lifts 2x weekly. Volunteers for [employer], [employer], and [employer]. She has 3 male children between the ages of 10-31 and 7 female children between the ages of 16-30.      Family History   Problem Relation Age of Onset     Diabetes Mother      Hypertension Mother      Arthritis Mother      Diabetes type II Mother      Diabetes Father      Diabetes Sister      Thyroid disease Sister      Diabetes Maternal Grandmother      Heart disease Maternal Grandfather      Acute Myocardial Infarction Other       Breast cancer Other      Stroke Other        Current Outpatient Prescriptions   Medication Sig Dispense Refill     acetaminophen (TYLENOL) 325 MG tablet Take 1 tablet (325 mg total) by mouth 4 (four) times a day. 120 tablet 0     ammonium lactate (LAC-HYDRIN) 12 % lotion Apply topically.       aspirin 81 MG EC tablet Take by mouth.       atorvastatin (LIPITOR) 20 MG tablet Take 20 mg by mouth daily.       blood glucose test (ACCU-CHEK SMARTVIEW TEST STRIP) strips Use 1 each As Directed 3 (three) times a day. Dispense brand per patient's insurance at pharmacy discretion. 100 each 11     cetirizine (ZYRTEC) 10 MG tablet Take 10 mg by mouth daily.       divalproex (DEPAKOTE) 250 MG EC tablet Take 250 mg by mouth 3 (three) times a day.       dulaglutide 1.5 mg/0.5 mL PnIj Inject 1.5 mg under the skin once a week.       fluticasone (FLONASE) 50 mcg/actuation nasal spray 2 sprays daily.       gabapentin (NEURONTIN) 300 MG capsule Take 900 mg by mouth 3 (three) times a day.       generic lancets 3 times daily. Test 3 times per day.       glipiZIDE (GLUCOTROL) 10 MG tablet Take 10 mg by mouth daily.       ibuprofen (ADVIL,MOTRIN) 800 MG tablet Take 800 mg by mouth 3 (three) times a day as needed.       lidocaine (XYLOCAINE) 5 % ointment APPLY TOPICALLY TO AFFECTED AREA(S) TWICE DAILY AS NEEDED 50 g 0     lisinopril (PRINIVIL,ZESTRIL) 20 MG tablet Take 20 mg by mouth daily.       nicotine polacrilex (NICORETTE) 4 MG gum Take 4 mg by mouth.       omeprazole (PRILOSEC) 20 MG capsule Take 20 mg by mouth daily.       prochlorperazine (COMPAZINE) 10 MG tablet TAKE 1 TABLET BY MOUTH EVERY 6 HOURS AS NEEDED FOR NAUSEA OR VOMITING 20 tablet 0     QUEtiapine (SEROQUEL) 100 MG tablet Take 100 mg by mouth 2 (two) times a day.       sitaGLIPtin (JANUVIA) 100 MG tablet Take 100 mg by mouth daily.       traMADol (ULTRAM) 50 mg tablet Take 50 mg by mouth daily as needed.       traZODone (DESYREL) 100 MG tablet Take 100 mg by mouth at  "bedtime.       No current facility-administered medications for this visit.        No Known Allergies    EXAM  Vitals:    06/06/17 1013   BP: 120/78   Pulse: 83   SpO2: 97%   Weight: 208 lb 1.6 oz (94.4 kg)   Height: 5' 4\" (1.626 m)     General: Alert, no distress  Neurologic: Oriented ×3  Musculoskeletal: Exam deferred    RESULTS REVIEWED:     ANALYSIS AND SUMMARY OF OLD RECORDS, NOTES AND CONSULTS (2): Reviewed [facility] note from 5/11/17 describing evaluation and treatment of right shoulder    RECORDS REQUESTED (1): Requested MRI from [facility].     OTHER HISTORY SUMMARIZED (from nursing staff, family, friends) (2):     RADIOLOGY TESTS SUMMARIZED or REQUESTED (XRAY/CT/MRI/DXA) (1): MRI results reviewed in [facility], as performed on 5/31/17      MEDICINE TESTS SUMMARIZED or REQUESTED (EKG/ECHO/COLONOSCOPY/EGD) (1): None.    INDEPENDENT REVIEW OF EKG OR X-RAY (2): None.    Data points  5       Yariel Chilel,   Internal Medicine  [facility]   "

## 2021-07-20 NOTE — PROGRESS NOTES
"Intake Note:     D) Lorie Vann is a 48 y.o.  single Black or  female who is referred to [facility] via [facility] with funding from [employer]. Patient orientated x 3. Patient meets criteria for Cocaine Use Disorder, Moderate (F14.20) (304.20) Cocaine.  Patient appears appropriate for [facility].   A) Met with patient for 45 minutes.  Completed intake assessment and preliminary paperwork. Patient was given and explained counselor & supervisor license number and contact info, Patient Bill of Rights, program rules/regulations, Program Abuse Prevention Plan, confidentiality & HIPPA policies, grievance procedure, presented ROIs, TB & HIV/AIDS policies & resources, and Vulnerable Adult policy.   Conducted Vulnerable Adult Assessment.   R)No special Vulnerable Adult needed at this time.  Patient signed and agreed to counselor & supervisor license number and contact info., Patient Bill of Rights, group rules/regulations, Program Abuse Prevention Plan, confidentiality & HIPPA policies, grievance procedure,  ROIs, TB & HIV/AIDS policies & resources, and Vulnerable Adult policy. Patient scored low risk on C-SSRS screen. Patient denied suicidal ideation/intent/plan/means at this time.     Opioid Use Disorder: No   Provided \"Options for Opioid Treatment in [place] and Overdose Prevention\" NA     Dimension #1 - Withdrawal Potential - Risk 0. Patient reports last use of cocaine in April 2018. Patient denies withdrawal symptoms.    Dimension #2 - Biomedical - Risk 1. Patient has several medical conditions. Patient has [employer] insurance. Patient has primary care provider. Patient is able to seek cares as needed.    Dimension #3 - Emotional , Behavioral and Cognitive - Risk 2. Patient reports schiziphrenia, anxiety, and bi-polar. Patient does not currently have a mental health care provider but is working with a mental health . Patient denies suicidal and homicidal ideations.     Dimension #4 - Readiness " for Change - Risk 0. Patient verbalizes a readiness and willingness for change.    Dimension #5 - Relapse Potential - Risk 3. Patient lacks healthy, positive coping skills. Patient lacks skills to prevent relapse. Patient has had 2 treatment episodes, both of which she did not complete. Patient has had significant sobriety in the past.    Dimension #6 - Recovery Environment - Risk 3. Patient is unemployed,receives disability. Patient does not have stable housing. Patient reports she has a support system. Patient has some structured daily activity. Patient is currently on probation with [facility]. Patient has significant criminal history.    T) Explained counselor & supervisor license number and contact info, Patient Bill of Rights, program rules/regulations, Program Abuse Prevention Plan, confidentiality & HIPPA policies, grievance procedure, presented ROIs, TB & HIV/AIDS policies & resources, and Vulnerable Adult policy. Patient expected to start group on 2/8/19.      Brandee Acuna  2/6/2019, 1:57 PM

## 2021-07-20 NOTE — PROGRESS NOTES
Weekly Progress Note  Lorie Vann  1970  [id number]      D) Pt attended 3/5 groups this week with no absences. No individual sessions were scheduled this week.   A) Staff facilitated groups and reviewed tx progress. Assessed for [facility]. R) No VAP needed at this time.     Any significant events, defines as events that impact patients relationship with others inside and outside of treatment: No  Indicate any changes or monitoring of physical or mental health problems: No  Indicate involvement by any outside supports: Yes: patient reports case management and probation.   IAPP reviewed and modified as needed: NA    Pt working on the following dimensions:  Dimension #1 - Withdrawal Potential - Risk 0. Patient reports last use of cocaine in April 2018. Patient denies withdrawal symptoms..  Specific goals from treatment plan addressed this week:  Patient reported no substance use this week. Patient was not requested to complete any UAs this week.   Effectiveness of strategies:  Strategies appear effective.   Dimension #2 - Biomedical - Risk 1. Patient has several medical conditions. Patient has [employer] insurance. Patient has primary care provider. Patient is able to seek cares as needed.  Specific goals from treatment plan addressed this week:  Patient reported plan to attend a medical appointment on 2/15/19, reported no changes to physical health this week.   Effectiveness of strategies:  Strategies appear effective.   Dimension #3 - Emotional/Behavioral/Cognitive - Risk 2. Patient reports schizophrenia, anxiety, and bi-polar. Patient does not currently have a mental health care provider but is working with a mental health . Patient denies suicidal and homicidal ideations.   Active interventions to stabilize mental health symptoms:  Patient reports taking medications as prescribed.   Specific goals from treatment plan addressed this week:  Patient requested assistance with referral to psychiatry, counselor  encouraged patient to reach out to her  as a first step. Patient also reported interest in an anger management group.   Effectiveness of strategies:  Strategies appear effective.    Dimension #4 - Readiness for Change - Risk 0. Patient verbalizes a readiness and willingness for change.  Specific goals from treatment plan addressed this week:  Patient attended 3/5 groups, communicated regarding absences.   Effectiveness of strategies:  Strategies appear effective.   Dimension #5 - Relapse Potential - Risk 3. Patient lacks healthy, positive coping skills. Patient lacks skills to prevent relapse. Patient has had 2 treatment episodes, both of which she did not complete. Patient has had significant sobriety in the past.  Specific goals from treatment plan addressed this week:  Patient reports no use or urges this week.   Effectiveness of strategies:  Strategies appear effective.   Dimension #6 - Recovery Environment - Risk 3. Patient is unemployed,receives disability. Patient does not have stable housing. Patient reports she has a support system. Patient has some structured daily activity. Patient is currently on probation with [facility]. Patient has significant criminal history.  Specific goals from treatment plan addressed this week:  Patient reported plan to attend recovery groups. Patient and counselor discussed patient's current housing options and managing stressors while still living with her daughter.   Effectiveness of strategies:  Strategies appear effective.   T) Treatment plan updated: no.  Patient notified and in agreement: patient signed on 2/8/19.   Patient has completed 10 of 90 program hours at this time. Projected discharge date is 5/6/19. Current discharge plan is TBD.     JONATHAN Dolan, [facility]  2/16/2019, 2:14 PM       Weekly Educational Topics Date   1. Dual Diagnoses, week 1 2/13, 2/15   2. Dual Diagnoses, week 2    3. Stress Management    4. Feelings/Emotions    5. Thinking     6. Change    7. Recovery Support    8. Relationships/Communication    9. Addiction 101    10. Relapse Prevention    11. Grief and Loss    12. Strengths    13. Wellness 2/8   Mindfulness  2/8, 2/15

## 2021-07-20 NOTE — PROGRESS NOTES
[facility] SUBSTANCE USE DISORDER  DISCHARGE SUMMARY         Name:  Lorie Vann   :  JONATHAN Dolan, JUANJO   Admit Date: 18   Discharge Date: 18   :  1970   Hours Completed: 41   Initial Diagnosis:  Cocaine Use Disorder, moderate    Final Diagnosis:  Cocaine Use Disorder, moderate   Discharge Address:    [address] [place] [address]   Funding Source:    [employer]     Discharge Type:  Absent Without Leave (AWOL)    Client was receiving residential services at the time of discharge:   No      Reasons for and circumstances of service termination:  Client completed intake for [facility] on 18 and started group on 9/10/18. Patient displayed inconsistent attendance, with unexcused absences and calling in frequently for excused absences. Due to this, patient's primary counselor intended to discharge patient as of 18, but the patient presented to group requested to return to continue with treatment. It was determined that that the patient could return to group, but would need to meet with counselor 1:1. Patient and counselor met on 10/8/18 to discuss partication going forward and sign attendance and expectation agreement, which stipulated that the patient would contact by 1pm on day of absence or it would be grounds for discharge. Patient did not attend group or contact staff on 18, 18, or 18. As such, patient is discharged AWOL as of 18.        If program discharge status was At Staff Request, the license lawson must identify the following:    Other interested parties conferred with: N/A     Referrals provided: N/A     Alternatives considered and attempted before deciding to discharge:  N/A       Dimension/Course of Treatment/Individualized Care:   1.  Withdrawal Potential - Intake Risk level -  0 Discharge Risk level - unable to assess at discharge due to lack of contact with patient, last risk ratings assessed on 18: 0  Narrative supporting risk  description:  Patient reports last use of crack in 4/2018, with most recent pattern of use of 1-2x per month, around $100 worth per use. Patient was requested to complete a UA on 10/19/18, which was positive for opiates (consistent with current medications) and ETG/S was positive for alcohol at an abnormally high level, which may be due to other reasons besides alcohol use.   Treatment plan goals and progress towards those goals:  Patient reported no use while in treatment. Patient complete all requested UAs while in treatment.      2.  Biomedical Conditions and Complications - Intake Risk level -  1 Discharge Risk level - unable to assess at discharge due to lack of contact with patient, last risk ratings assessed on 11/1/18: 1  Narrative supporting risk description:  Patient reports current health concerns of diabetes, neuropathy, carpal tunnel, bulging discs/degenerating disc, HBP. Patient reports having a primary care physician and taking medications as prescribed.  Treatment plan goals and progress towards those goals:  Patient appeared able to address medical needs as necessary while in treatment, provided documentation for doctor and dental visits. Patient reported no significant changes in health while in treatment.     3.  Emotional/Behavioral/Cognitive Conditions and Complications - Intake Risk level -  2 Discharge Risk level - unable to assess at discharge due to lack of contact with patient, last risk ratings assessed on 11/1/18: 2  Narrative supporting risk description:  Patient reports history of mental health diagnosis of schizophrenia, bipolar and anxiety, and reports current mental health services through [facility]. Patient reports history of mental health hospitalizations in the past, unsure of last date. Patient reports having significant mental health symptoms a month prior to starting treatment services.   Treatment plan goals and progress towards those goals:  Patient reported taking medications  and meeting with  while in treatment. Patient was able to identify and use some coping skills while in outpatient treatment.      4.  Readiness for Change - Intake Risk level -  1 Discharge Risk level - unable to assess at discharge due to lack of contact with patient, last risk ratings assessed on 11/1/18: 1  Narrative supporting risk description:  Patient reports external motivation from probation, but also reports wanting to learn other skills to support her recovery.   Treatment plan goals and progress towards those goals:  Patient has not been able to meet treatment expectations of attend 4, 3-hour groups per week and contacting staff regarding all absences.      5.  Relapse/Continued Use/Continued Problem Potential - Intake Risk level -  2 Discharge Risk level - unable to assess at discharge due to lack of contact with patient, last risk ratings assessed on 11/1/18: 2  Narrative supporting risk description:  Patient reports last use in 04/2018, reports she has had periods of abstinence in the past. Patient identifies homelessness, anger and money have been triggers for use. Patient reports history of 1 previous treatment.   Treatment plan goals and progress towards those goals:  Patient did not report any urges or use while in treatment.      6.  Recovery Environment - Intake Risk level -  3 Discharge Risk level - unable to assess at discharge due to lack of contact with patient, last risk ratings assessed on 11/1/18: 3  Narrative supporting risk description:  Patient reports that she is currently in school at [employer] and staying with family members. Patient reports she working on getting into housing with her  through [facility]. Patient reports having supportive family and peer relationships and hopes to begin to attend NA meetings. Patient reports she is currently on probation through [facility].   Treatment plan goals and progress towards those goals:  Patient indicated  remaining in touch with her . Patient did not report any recovery meeting attendance.      Strengths as identified by patient: God, family, friends, myself--choosing to not get high. Determined, motivated, outgoing.   Needs as identified by patient: housing, more coping tools.   Services Provided: Intake, assessment, treatment planning, education, group discussion, film, lectures, 1x1 therapy, and recommendations at discharge.        Program Involvement: Fair  Attendance: Poor  Ability to relate in group/   Other program activities: Fair  Assignment Completion: Poor  Overall Behavior: Fair  Reported Family/Significant   Other Involvement: Poor    Prognosis: Fair      Recommendations       Patient is recommended to be re-assessed to determine appropriate level of care.     Mental Health Referral  Patient is referred to her current mental health services.     Physical Health Referral:  Patient is referred to her primary care physician.        Counselor Name and Title:  JONATHAN Dolan, [facility]        Date:  11/15/2018  Time:  8:56 AM

## 2021-07-20 NOTE — PROGRESS NOTES
Is scheduled for an appointment on 8/28 did not show.  Has not rescheduled.  I would have discussed at that appointment the fact that her medical insurance is not covering my services a pain provider at the pain clinic.

## 2021-07-20 NOTE — PROGRESS NOTES
Clinic Care Coordination Contact  [facility]/Voicemail    Clinical Data: Care Coordinator Outreach  Outreach attempted x 2.  Left message on patient's voicemail with call back information and requested return call.  Plan: Care Coordinator will try to reach the patient again in the next 5-10 business days.   Note: mail letter to the patient at the next outreach follow up if the patient is no answer and no returning called (before the next outreach follow up).

## 2021-07-20 NOTE — LETTER
Letter by India Forde MD at      Author: India Forde MD Service: -- Author Type: --    Filed:  Encounter Date: 4/1/2019 Status: (Other)         Lorie Vann  [address] [facility] [address]  [place] [address]             April 1, 2019         Dear MsOralia Vann,    Below are the results from your recent visit:    Resulted Orders   Wet Prep, Vaginal   Result Value Ref Range    Yeast Result No yeast seen No yeast seen    Trichomonas No Trichomonas seen No Trichomonas seen    Clue Cells, Wet Prep Clue cells seen (!) No Clue cells seen   Chlamydia trachomatis & Neisseria gonorrhoeae, Amplified Detection   Result Value Ref Range    Chlamydia trachomatis, Amplified Detection Negative Negative    Neisseria gonorrhoeae, Amplified Detection Negative Negative   Gynecologic Cytology (PAP Smear)   Result Value Ref Range    Case Report       Gynecologic Cytology Report                       Case: [id number]                                   Authorizing Provider:  Maurisio,         Collected:           03/22/2019 Manuela Osborne MD                                                                 Ordering Location:     [facility] Family  Received:            03/22/2019 [facility]                                     Medicine/OB                                                                  First Screen:          Fer Jesus CT                                                                           (ASCP)                                                                       Rescreen:              Rimma Anderson CT                                                                            (ASCP)                                                                       Specimen:    SUREPATH PAP, SCREENING, Endocervical/cervical                                             Interpretation  Negative for squamous intraepithelial lesion or malignancy.       Negative for squamous intraepithelial lesion or malignancy    Result Flag Normal Normal    Other Findings       Shift in vaginal carole suggestive of bacterial vaginosis    Specimen Adequacy       Satisfactory for evaluation, endocervical/transformation zone component absent  Lack of clinical history, LMP not given    HPV Reflex? Yes regardless of result     HIGH RISK No     LMP/Menopause Date unknown     Abnormal Bleeding No     Pt Status not aplicable     Birth Control/Hormones None     Previous Normal/Date 09/23/15     Prev Abn Date/Dx 04/11/18, positive HPV; h/o ANGELINE III     Cervical Appearance normal,    HPV High Risk DNA Cervical   Result Value Ref Range    HPV Source SurePath     HPV16 DNA Negative NEG    HPV18 DNA Negative NEG    Other HR HPV Positive (!) NEG    Final Diagnosis SEE NOTES       Comment:      This patient's sample is positive for other HR HPV DNA (types 31, 33, 35, 39, 45,  51, 52, 56, 58, 59, 66 or 68), not HPV 16 or HPV 18 DNA. This result requires  clinical correlation with concurrent cytology findings.  This test was developed and its performance characteristics determined by the  [facility], [facility]. It  has not been cleared or approved by the FDA. The laboratory is regulated under  CLIA as qualified to perform high-complexity testing. This test is used for  clinical purposes. It should not be regarded as investigational or for  research.  (Note)  METHODOLOGY:  The Roche catrachito 4800 system uses automated extraction,  simultaneous amplification of HPV (L1 region) and beta-globin,  followed by  real time detection of fluorescent labeled HPV and beta  globin using specific oligonucleotide probes . The test specifically  identifies types HPV 16 DNA and HPV 18 DNA while concurrently  detecting the rest of the high risk   types (31, 33, 35, 39, 45, 51,  52, 56, 58, 59, 66 or 68).    COMMENTS:  This test is not intended for use as a screening  device  for women under age 30 with normal cervical cytology.  Results should  be correlated with cytologic and histologic findings. Close clinical  followup is recommended.        Specimen Description Cervical Cells       Comment:      PERFORMED AT  [facility] [address] [address]        I haven't been able to reach you by phone - I will try this (and send a letter, too).  1.  Please call me or send me a current list of your medications so we can refill them.  There are several on your list that don't appear to be active medications for you.  2.  Your pap smear is normal.  But there is high risk HPV (human papilloma virus) - this is the virus that can cause changes that can lead to cervix cancer.  You should recheck the pap smear in 6-12 months.    3.  You have bacterial vaginosis (clue cells) - I have sent a prescription for Metronidazole to your pharmacy.  Take this twice a day for 7 days.     Please call with questions or contact us using [facility].    Sincerely,        Electronically signed by India Forde MD

## 2021-07-20 NOTE — PROGRESS NOTES
Assessment/Plan:     Problem List Items Addressed This Visit     None              No follow-ups on file.    There are no Patient Instructions on file for this visit.      Subjective:       49 y.o. female patient was scheduled for appointment.  Telephone number is not in service.    Reviewing the chart and may primary care tried to reach her and was not successful.    In care everywhere she has been receiving care for eye problems pelvic problems the [facility].    Reviewing  she is receiving Ambien through his psychiatrist    She was dismissed from the [facility] in 2017 due to chronic no-shows.  She will not be rescheduled here unless we hear from her primary care provider.  It appears she may be following with the [facility].    -         Objective:   There were no vitals filed for this visit.                This note has been dictated using voice recognition software. Any grammatical or context distortions are unintentional and inherent to the software

## 2021-07-20 NOTE — TELEPHONE ENCOUNTER
I need to review patient's medication list with her to determine which medications need refills - this medicine hasn't been filled in months.  I need to know which refills she needs. I get generic voice mail on one number and constant busy signal on the other number.

## 2021-07-20 NOTE — TELEPHONE ENCOUNTER
Let us do the pill because she has not tried it this year, its least is effective and it is the only thing that is truly a prescription

## 2021-07-20 NOTE — PROGRESS NOTES
Weekly Progress Note  Lorie Vann  1970  [id number]      D) Pt 3/4 groups this week with 1 excused absence. No individual sessions were scheduled this week.   A) Staff facilitated groups and reviewed tx progress. Assessed for [facility]. R) No VAP needed at this time.     Any significant events, defines as events that impact patients relationship with others inside and outside of treatment: No  Indicate any changes or monitoring of physical or mental health problems: No  Indicate involvement by any outside supports: No  IAPP reviewed and modified as needed: NA    Pt working on the following dimensions:  Dimension #1 - Withdrawal Potential - Risk 0. Patient reports last of crack in 4/2018, reports most recent pattern of use has been 1-2x per month, around $100 per time. Patient does not report or display any withdrawal concerns at time of intake. Patient was requested to complete a UA on 10/19/18, which was positive for opiates (consistent with current medications) and ETG/S was positive for alcohol at an abnormally high level.   Specific goals from treatment plan addressed this week:  Patient reported no use this past week. Patient was not requested to complete a UA this week.   Effectiveness of strategies:  Counselor to review last UA with patient on 11/2/18.   Dimension #2 - Biomedical - Risk 1. Patient reports current health concerns of diabetes, neuropathy, carpal tunnel, bulging discs/degenerating disc, HBP. Patient reports having a primary care physician and taking medications as prescribed.    Specific goals from treatment plan addressed this week:  Patient provided documentation for emergency dentist on 10/26/18 (but was not seen due to long wait) and left early for dental appointment on 10/29/18.   Effectiveness of strategies:  Strategies appear effective.   Dimension #3 - Emotional/Behavioral/Cognitive - Risk 2. Patient reports history of mental health diagnosis of schizophrenia, bipolar and anxiety, and  reports current mental health services through [facility]. Patient reports history of mental health hospitalizations in the past, unsure of last date. Patient reports having signficant mental health symptoms around a month ago, when she was on the streets and had difficulty knowing where she was and what was going on. Patient denies any current SI/HI/SIB.  Active interventions to stabilize mental health symptoms:  Patient reports taking medications, reported meeting with  this week.   Specific goals from treatment plan addressed this week:  Patient identified that a strength of hers is that she is able to express her emotions, which helps her in her relationships.   Effectiveness of strategies:  Strategies appear effective.  Dimension #4 - Readiness for Change - Risk 1. Patient reports external motivation from probation, but also reports wanting to learn other skills to support her recovery.  Specific goals from treatment plan addressed this week:  Patient attended 3/4 groups, communicated about absence on 10/26/18.   Effectiveness of strategies:  Strategies appear effective, counselor to meet with patient to discuss treatment progress.   Dimension #5 - Relapse Potential - Risk 2. Patient reports last use in 04/2018, reports she has had periods of abstinence in the past. Patient identifies homelessness, anger and money have been triggers for use. Patient reports history of 1 previous treatment.   Specific goals from treatment plan addressed this week:  Patient reported no urges or use this past week.   Effectiveness of strategies:  Strategies appear effective.   Dimension #6 - Recovery Environment - Risk 3. Patient reports that she is currently in school at [employer] and staying with family members. Patient reports she working on getting into housing with her  through [facility]. Patient reports having supportive family and peer relationships and hopes to begin to attend NA meetings. Patient  reports she is currently on probation through [facility].   Specific goals from treatment plan addressed this week:  Patient discussed attempting to set boundaries with her daughter.   Effectiveness of strategies:  Strategies appear effective.  T) Treatment plan updated: no  Patient notified and in agreement: N/A    Patient educated on Strengths. Patient has completed 41 of 90 program hours at this time. Projected discharge date is 12/4/18. Current discharge plan is TBD.     JONATHAN Dolan, [facility]  11/1/2018, 3:33 PM       Weekly Educational Topics Date   1. Dual Diagnoses, week 1    2. Dual Diagnoses, week 2    3. Stress Management    4. Feelings/Emotions    5. Thinking 9/10, 9/11   6. Change 9/17   7. Recovery Support    8. Relationships/Communication    9. Addiction 101 10/9   10. Relapse Prevention 10/15, 10/16, 10/17, 10/19   11. Grief and Loss 10/23, 10/24   12. Strengths 10/29, 10/30, 10/31   13. Wellness    Mindfulness  10/19

## 2021-07-20 NOTE — PROGRESS NOTES
Lorie Vann attended 3 hours of group therapy today.    Total group size of 9.    10/9/2018 2:31 PM Augustina Leblanc

## 2021-07-20 NOTE — TELEPHONE ENCOUNTER
Central PA team  [phone]  Pool: HE PA MED ([id number])          PA has been initiated.       PA form completed and faxed insurance via Cover My Meds     Key:  [id number]     Medication:  TRULICITY 1.5MG/0.5ML    Insurance:  [facility]        Response will be received via fax and may take up to 5-10 business days depending on plan

## 2021-07-20 NOTE — PROGRESS NOTES
Lorie Vann attended 3 hours of group therapy today.    Total group size of 11.    10/15/2018 12:18 PM Augustina Leblanc

## 2021-07-20 NOTE — PROGRESS NOTES
Lorie Vann attended 3 hours of group therapy today.    Total group size of 11.    9/17/2018 1:47 PM Augustina Leblanc
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
## 2021-07-20 NOTE — TELEPHONE ENCOUNTER
Central PA team  [phone]  Pool: HE PA MED ([id number])          PA has been initiated.       PA form completed and faxed insurance via Cover My Meds     Key:  [id number]     Medication:  traMADol (ULTRAM) 50 mg tablet    Insurance:  [employer]        Response will be received via fax and may take up to 5-10 business days depending on plan

## 2021-07-20 NOTE — PROGRESS NOTES
Lorie Vann attended 3 hours of group therapy today. Today was patient's first day of group. Patient introduced herself and was welcomed by group.     Total group size of 10.    2/8/2019 2:17 PM Augustina Leblanc

## 2021-07-20 NOTE — TELEPHONE ENCOUNTER
Medication Request  Medication name: Trazadone 100 mg at bedtime  Pharmacy Name and Location: [facility]  Reason for request: Patient needs a new prescription, prescription is listed as historical  When did you use medication last?:  Currently taking  Patient offered appointment:  patient declined  Okay to leave a detailed message: yes    Medication Request  Medication name: Gabapentin 900 mg three times a day  Pharmacy Name and Location: [facility]  Reason for request: Patient needs a new prescription, prescription is listed as historical  When did you use medication last?:  Currently taking  Patient offered appointment:  patient declined  Okay to leave a detailed message: yes    Reviewed all of patient's medication with her. Medications listed are all correct.    Patient is asking if a copy of her medication list, diagnoses and referrals placed could be printed and left for her at the , to pick-up tomorrow.

## 2021-07-20 NOTE — PROGRESS NOTES
Individual Treatment Plan    Patient  Name: Lorie Vann  MRN: [id number]   : 1970  Admit Date: 18  Date of Initial Service Plan: 18  Tentative Discharge Date: 18    Diagnosis: Cocaine Use Disorder, Moderate    Counselor: JONATHAN Dolan, JUANJO      Dimension 1: Acute Intoxication/Withdrawal Potential, Risk level: 0    Problem Statement from Comprehensive Assessment:  Patient reports last use of crack in 2018, reports most recent pattern of use has been 1-2x per month, around $100 per time. Patient does not report or display any withdrawal concerns at time of intake.     Problem: Patient reports last use of crack in 2018.   Goal: Patient to maintain abstinence throughout outpatient treatment.   Must be reached to complete treatment? Yes  Methods/Strategies (must include amount and frequency):   1. Patient to report any substance/alcohol use to counselor to determine if any changes need to be made to address withdrawal symptoms.   2. Patient to complete any requested UAs.   Target Date: 18  Completion Date:       Dimension 2: Biomedical Conditions/Complications, Risk level: 1    Problem Statement from Comprehensive Assessment:  Patient reports current health concerns of diabetes, neuropathy, carpal tunnel, bulging discs/degenerating disc, HBP. Patient reports having a primary care physician and taking medications as prescribed.    Problem: Patient reports current health concerns.   Goal: Patient to maintain stable health throughout outpatient treatment.   Must be reached to complete treatment? No  Methods/Strategies (must include amount and frequency):   1. Patient to report any changes to physical health to counselor.   2. Patient to attend all scheduled doctor s appointments.   3. Patient to take medications as prescribed.   Target Date: 18  Completion Date:     Problem: Patient reports current tobacco use.   Goal: Patient to receive information about smoking cessation.    Must be reached to complete treatment? No  Methods/Strategies (must include amount and frequency):   1. Staff to provide patient with nicotine cessation information and help on how to quit use.   2. Patient to report any progress on stopping nicotine use to staff.   Target Date: 12/4/18  Completion Date:       Dimension 3: Emotional/Behavioral/Cognitive, Risk level: 2    Problem Statement from Comprehensive Assessment:  Patient reports history of mental health diagnosis of schizophrenia, bipolar and anxiety, and reports current mental health services through [facility]. Patient reports history of mental health hospitalizations in the past, unsure of last date. Patient reports having signficant mental health symptoms around a month ago, when she was on the streets and had difficulty knowing where she was and what was going on. Patient denies any current SI/HI/SIB.     Problem: Patient reports history of mental health diagnosis and has current mental health services.   Goal: Patient to continue to engage in current mental health services.   Must be reached to complete treatment? Yes  Methods/Strategies (must include amount and frequency):   1. Patient to attend all mental health appointments.   2. Patient to continue to take medications as prescribed.   3. Patient to report any changes to mental health services, also report any benefits or challenges.   Target Date: 12/4/18  Completion Date:     Problem: Patient identifies needing more coping skills to address her mental health.   Goal: Patient to learn strategies to address mental health.   Must be reached to completed treatment? Yes  Methods/Strategies (must include amount and frequency):   1. Patient to begin using coping skills learned in therapeutic group (such as grounding, breathing, thought challenging, mindfulness, etc), and share in daily check-in any benefits or challenges that you experience using these skills.  Target Date: 12/4/18  Completion Date:        Dimension 4: Readiness to Change, Risk level 1    Problem Statement from Comprehensive Assessment:  Patient reports external motivation from probation, but also reports wanting to learn other skills to support her recovery.     Problem: Patient reports external motivation from probation, but also reports wanting to learn other skills to support her recovery.   Goal: Patient to increase motivation towards recovery by participating in outpatient programming.   Must be reached to complete treatment? Yes  Methods/Strategies (must include amount and frequency):   1. Patient to attend 4, 3-hour groups per week and all scheduled 1:1s.  2. Patient will contact staff if unable to attend (Augustina: [phone], Lynda: [phone])  Target Date: 12/4/18  Completion Date:       Dimension 5: Relapse/Continued Use/Continued Problem Potential, Risk level: 2    Problem Statement from Comprehensive Assessment:  Patient reports last use in 04/2018, reports she has had periods of abstinence in the past. Patient identifies homelessness, anger and money have been triggers for use. Patient reports history of 1 previous treatment.      Problem: Patient identifies homelessness, anger, and money have been triggers to use.   Goal: Patient to gain understanding of relapse prevention skills to prevent use.   Must be reached to complete treatment? Yes  Methods/Strategies (must include amount and frequency):   1. Patient to share in daily check-in any urges and addictive thinking to better understand her pattern of use and to prevent relapse in the future.   Target Date: 12/4/18  Completion Date:       Dimension 6: Recovery Environment, Risk level: 3    Problem Statement from Comprehensive Assessment:  Patient reports that she is currently in school at [employer] and staying with family members. Patient reports she working on getting into housing with her  through [facility]. Patient reports having supportive family and  peer relationships and hopes to begin to attend NA meetings. Patient reports she is currently on probation through [facility].     Problem: Patient reports interest in attending NA meetings.   Goal: Patient to attend recovery support meetings to increase support for her recovery.   Must be reached to complete treatment? No  Methods/Strategies (must include amount and frequency):   1. Patient to identify 3-4 meetings that she has interest in and attend at least 1. Patient to report back to counselor/group any benefits you experience.   Target Date: 12/4/18  Completion Date:     Problem: Patient reports current probation through [facility].   Goal: Patient to remain compliant with probation case plan.   Must be reached to completed treatment? Yes  Methods/Strategies (must include amount and frequency):   1. Patient to attend scheduled PO meetings.   2. Patient to follow expectations of probation.   Target Date: 12/4/18  Completion Date:         Resources  Resources to which the patient is being referred for problems when problems are to be addressed concurrently by another provider: Patient reports current mental health services through [facility].       By signing this document, I am acknowledging that I was actively and directly involved in the development of my treatment plan.         Patient  Signature_________________________________________         Date__________________        Staff Signature  JONATHAN Dolan, [facility]    Date: 9/11/2018, 8:56 AM

## 2021-07-20 NOTE — TELEPHONE ENCOUNTER
Controlled Substance Refill Request  Medication:   Requested Prescriptions     Pending Prescriptions Disp Refills     traMADol (ULTRAM) 50 mg tablet [Pharmacy Med Name: TRAMADOL 50MG TABLETS] 30 tablet 0     Sig: TAKE 1 TABLET BY MOUTH EVERY DAY AS NEEDED.     Date Last Fill: 4/15/19  Pharmacy: [email or web address] [id number]   Submit electronically to pharmacy  Controlled Substance Agreement on File:   Encounter-Level CSA Scan Date:    There are no encounter-level csa scan date.       Last office visit: Last office visit pertaining to requested medication was 3/22/19.

## 2021-07-20 NOTE — TELEPHONE ENCOUNTER
Pt communication preference: [email or web address].     Sent a message to [employer] for patient regarding questions below.   Will postpone task to the next day.

## 2021-07-20 NOTE — PROGRESS NOTES
LATE ENTRY FOR WEEK OF 9/10/18--9/14/18    Weekly Progress Note  Lorie Vann  1970  [id number]      D) Pt attended 2/4 groups this week with 1 excused and 1 unexcused absences. No individual sessions were scheduled this week.   A) Staff facilitated groups and reviewed tx progress. Assessed for [facility]. R) No VAP needed at this time.     Any significant events, defines as events that impact patients relationship with others inside and outside of treatment: No  Indicate any changes or monitoring of physical or mental health problems: No  Indicate involvement by any outside supports: No  IAPP reviewed and modified as needed: NA    Pt working on the following dimensions:  Dimension #1 - Withdrawal Potential - Risk 0. Patient reports last of crack in 4/2018, reports most recent pattern of use has been 1-2x per month, around $100 per time. Patient does not report or display any withdrawal concerns at time of intake.   Specific goals from treatment plan addressed this week:  Patient reported no use this past week. Patient was not requested to complete any UAs.   Effectiveness of strategies:  Strategies appear effective.  Dimension #2 - Biomedical - Risk 1. Patient reports current health concerns of diabetes, neuropathy, carpal tunnel, bulging discs/degenerating disc, HBP. Patient reports having a primary care physician and taking medications as prescribed.    Specific goals from treatment plan addressed this week:  Patient reported no changes to physical health this week.   Effectiveness of strategies:  Strategies appear effective.   Dimension #3 - Emotional/Behavioral/Cognitive - Risk 2. Patient reports history of mental health diagnosis of schizophrenia, bipolar and anxiety, and reports current mental health services through [facility]. Patient reports history of mental health hospitalizations in the past, unsure of last date. Patient reports having signficant mental health symptoms around a month ago, when she was on the  streets and had difficulty knowing where she was and what was going on. Patient denies any current SI/HI/SIB.  Active interventions to stabilize mental health symptoms:  Patient reports taking medications.   Specific goals from treatment plan addressed this week:  Patient reported no changes to mental health symptoms. Patient reported better awareness of cognitive distortions.   Effectiveness of strategies:  Strategies appear effective.  Dimension #4 - Readiness for Change - Risk 1. Patient reports external motivation from probation, but also reports wanting to learn other skills to support her recovery.  Specific goals from treatment plan addressed this week:  Patient attended 2/4 groups this week, but left early both days. Patient communicated about absence on 9/14/18, but did not call about absence on 9/12/18 until the day after.  Effectiveness of strategies:  Counselor to review attendance policy with patient.   Dimension #5 - Relapse Potential - Risk 2. Patient reports last use in 04/2018, reports she has had periods of abstinence in the past. Patient identifies homelessness, anger and money have been triggers for use. Patient reports history of 1 previous treatment.   Specific goals from treatment plan addressed this week:  Patient reported no urges or use this past week.   Effectiveness of strategies:  Strategies appear effective.  Dimension #6 - Recovery Environment - Risk 3. Patient reports that she is currently in school at [employer] and staying with family members. Patient reports she working on getting into housing with her  through [facility]. Patient reports having supportive family and peer relationships and hopes to begin to attend NA meetings. Patient reports she is currently on probation through [facility].   Specific goals from treatment plan addressed this week:  Patient discussed some difficulty she is experiencing in her relationships with her daughters. Patient reports that  she is attending a conference on homelessness over the weekend.   Effectiveness of strategies:  Strategies appear effective.  T) Treatment plan updated: yes.  Patient notified and in agreement: yes   Patient has completed 5 of 90 program hours at this time. Projected discharge date is 12/4/18. Current discharge plan is TBD.     JONATHAN Dolan, [facility]  9/17/2018, 4:22 PM       Weekly Educational Topics Date   1. Dual Diagnoses, week 1    2. Dual Diagnoses, week 2    3. Stress Management    4. Feelings/Emotions    5. Thinking 9/10, 9/11   6. Change    7. Recovery Support    8. Relationships/Communication    9. Addiction 101    10. Relapse Prevention    11. Grief and Loss    12. Strengths    13. Wellness    Mindfulness

## 2021-07-20 NOTE — TELEPHONE ENCOUNTER
"Letter is written and signed - it can be sent to [employer]  Please let Denice know that there is not documentation for \"face to face\" visit for these in any note.  Nor did we discuss any of these at her last visit.   "

## 2021-07-20 NOTE — PROGRESS NOTES
Lorie Vann attended 3 hours of group therapy today.    Total group size of 12.    10/16/2018 1:44 PM Augustina Leblanc

## 2021-07-20 NOTE — TELEPHONE ENCOUNTER
Dr. Biggs, [facility] medical needs strength/size indicated for compression stockings and there is clarification request for knee brace, thank you.

## 2021-07-20 NOTE — LETTER
Letter by India Forde MD at      Author: India Forde MD Service: -- Author Type: --    Filed:  Encounter Date: 5/17/2019 Status: (Other)         05/20/19    RE: Lorie OLAMIDE Vann  1970    To Whom It May Concern:    Please provide the following for this patient:     Compression Stockings 15-20 mm Hg , (2 pair - will need to be measured for size)    Diagnosis :  Peripheral Neuropathy G62.9                        Diabetic polyneuropathy    E11.42      Thank you for your attention to this matter.     Sincerely,        India Forde MD

## 2021-07-20 NOTE — PROGRESS NOTES
Weekly Progress Note  Lorie Vann  1970  [id number]      D) Pt attended 0 groups this week with no absences, patient expected to start group on 2/8/19. Patient attended 1/1 individual sessions this week. A) Staff facilitated groups and reviewed tx progress. Assessed for VA. R) No VAP needed at this time.     Any significant events, defines as events that impact patients relationship with others inside and outside of treatment: No  Indicate any changes or monitoring of physical or mental health problems: No  Indicate involvement by any outside supports: Yes: patient reports case management and probation.   IAPP reviewed and modified as needed: NA    Pt working on the following dimensions:  Dimension #1 - Withdrawal Potential - Risk 0. Patient reports last use of cocaine in April 2018. Patient denies withdrawal symptoms..  Specific goals from treatment plan addressed this week:  Patient to sign plan at first group.   Effectiveness of strategies:  N/A   Dimension #2 - Biomedical - Risk 1. Patient has several medical conditions. Patient has [employer] insurance. Patient has primary care provider. Patient is able to seek cares as needed.  Specific goals from treatment plan addressed this week:  Patient to sign plan at first group.   Effectiveness of strategies:  N/A   Dimension #3 - Emotional/Behavioral/Cognitive - Risk 2. Patient reports schizophrenia, anxiety, and bi-polar. Patient does not currently have a mental health care provider but is working with a mental health . Patient denies suicidal and homicidal ideations.   Active interventions to stabilize mental health symptoms:  Patient reports taking medications as prescribed.   Specific goals from treatment plan addressed this week:  Patient to sign plan at first group.   Effectiveness of strategies:  N/A   Dimension #4 - Readiness for Change - Risk 0. Patient verbalizes a readiness and willingness for change.  Specific goals from treatment plan  addressed this week:  Patient to sign plan at first group.   Effectiveness of strategies:  N/A   Dimension #5 - Relapse Potential - Risk 3. Patient lacks healthy, positive coping skills. Patient lacks skills to prevent relapse. Patient has had 2 treatment episodes, both of which she did not complete. Patient has had significant sobriety in the past.  Specific goals from treatment plan addressed this week:  Patient to sign plan at first group.   Effectiveness of strategies:  N/A   Dimension #6 - Recovery Environment - Risk 3. Patient is unemployed,receives disability. Patient does not have stable housing. Patient reports she has a support system. Patient has some structured daily activity. Patient is currently on probation with [facility]. Patient has significant criminal history.  Specific goals from treatment plan addressed this week:  Patient to sign plan at first group.   Effectiveness of strategies:  N/A   T) Treatment plan updated: yes.  Patient notified and in agreement: Patient to sign plan at first group.  Patient has completed 1 of 90 program hours at this time. Projected discharge date is 5/6/19. Current discharge plan is TBD.     JONATHAN Dolan, [facility]  2/8/2019, 9:08 AM       Weekly Educational Topics Date   1. Dual Diagnoses, week 1    2. Dual Diagnoses, week 2    3. Stress Management    4. Feelings/Emotions    5. Thinking    6. Change    7. Recovery Support    8. Relationships/Communication    9. Addiction 101    10. Relapse Prevention    11. Grief and Loss    12. Strengths    13. Wellness    Mindfulness

## 2021-07-20 NOTE — TELEPHONE ENCOUNTER
Request has been submitted via [facility]. Waiting for questions to generate before completing PA.

## 2021-07-20 NOTE — TELEPHONE ENCOUNTER
Patient already have an appointment with Dr Biggs for 3/22 @ 10:30am. Added notes to that visit and left voicemail for patient to inform that patient needs to be seen before further refills. Completing task.

## 2021-07-20 NOTE — TELEPHONE ENCOUNTER
Question following Office Visit  When did you see your provider: 03/22/2019  What is your question: Patient was under the impression all of her medications were going to be refilled post her office visit today. Please advise.  Okay to leave a detailed message: Yes

## 2021-07-20 NOTE — PROGRESS NOTES
Weekly Progress Note  Lorie Vann  1970  [id number]      D) Patient presented to group this week and was informed by staff that the intention was to discharge patient. Patient reported that she is ready to engage in treatment and has made changes in her schedule to do so. Counselor consulted with other staff and it was determined that the primary counselor would meet with patient on 10/08/18 to review treatment expectations going forward. If patient does not present to this 1:1, patient will be discharged. A) Staff facilitated groups and reviewed tx progress. Assessed for VA. R) Unable to assess along six dimensions or for VA due to lack of attendance.   T) Patient has completed 8 of 90 program hours at this time. Projected discharge date is 12/4/18. Current discharge plan is TBD.     JONATHAN Dolan, [facility]  10/6/2018, 2:29 PM       Weekly Educational Topics Date   1. Dual Diagnoses, week 1    2. Dual Diagnoses, week 2    3. Stress Management    4. Feelings/Emotions    5. Thinking 9/10, 9/11   6. Change 9/17   7. Recovery Support    8. Relationships/Communication    9. Addiction 101    10. Relapse Prevention    11. Grief and Loss    12. Strengths    13. Wellness    Mindfulness

## 2021-07-20 NOTE — PROGRESS NOTES
Lorie Vann attended 3 hours of group therapy today.    Total group size of 10.    10/17/2018 2:33 PM Augustina Leblanc

## 2021-07-20 NOTE — ADDENDUM NOTE
Addendum Note by Yariel Alba MD at 2/17/2020  2:40 PM     Author: Yariel Alba MD Service: -- Author Type: Physician    Filed: 2/18/2020  3:28 PM Date of Service: 2/17/2020  2:40 PM Status: Signed    : Yariel Alba MD (Physician)    Encounter addended by: Yariel Alba MD on: 2/18/2020  3:28 PM      Actions taken: Clinical Note Signed

## 2021-07-20 NOTE — PROGRESS NOTES
Weekly Progress Note  Lorie Vann  1970  [id number]      D) Pt attended 1/4 groups this week with absences. Counselor and patient met briefly on 10/8/18 to review treatment expectations and attendance agreement. Patient attended group on 10/10/18, but left early without informing staff. Patient contacted staff on 10/12/18 indicating that she has been extremely sick and would return to group on 1015/18 with documentation from her physician.   A) Staff facilitated groups and reviewed tx progress. Assessed for [facility]. R) No VAP needed at this time.     Any significant events, defines as events that impact patients relationship with others inside and outside of treatment: No  Indicate any changes or monitoring of physical or mental health problems: No  Indicate involvement by any outside supports: No  IAPP reviewed and modified as needed: NA    Pt working on the following dimensions:  Dimension #1 - Withdrawal Potential - Risk 0. Patient reports last of crack in 4/2018, reports most recent pattern of use has been 1-2x per month, around $100 per time. Patient does not report or display any withdrawal concerns at time of intake.   Specific goals from treatment plan addressed this week:  Patient reported no use this past week. Patient was not requested to complete any UAs.   Effectiveness of strategies:  Strategies appear effective.  Dimension #2 - Biomedical - Risk 1. Patient reports current health concerns of diabetes, neuropathy, carpal tunnel, bulging discs/degenerating disc, HBP. Patient reports having a primary care physician and taking medications as prescribed.    Specific goals from treatment plan addressed this week:  Patient reported on 10/12/18 that she has been extremely sick and would be bringing documentation regarding illness on 10/12/18.   Effectiveness of strategies:  Strategies appear effective.   Dimension #3 - Emotional/Behavioral/Cognitive - Risk 2. Patient reports history of mental health diagnosis  of schizophrenia, bipolar and anxiety, and reports current mental health services through [facility]. Patient reports history of mental health hospitalizations in the past, unsure of last date. Patient reports having signficant mental health symptoms around a month ago, when she was on the streets and had difficulty knowing where she was and what was going on. Patient denies any current SI/HI/SIB.  Active interventions to stabilize mental health symptoms:  Patient reports taking medications, reported meeting with  this week,   Specific goals from treatment plan addressed this week:  Patient reported no changes to mental health symptoms.   Effectiveness of strategies:  Strategies appear effective.  Dimension #4 - Readiness for Change - Risk 1. Patient reports external motivation from probation, but also reports wanting to learn other skills to support her recovery.  Specific goals from treatment plan addressed this week:  Patient attended 1/3, groups this week. Counselor and patient met on 10/8/18 to review treatment attendance and participation agreement. If patient does not present with documentation regarding illness on 10/15/18, patient will be discharge ASR.   Effectiveness of strategies:  Strategies appear effective.   Dimension #5 - Relapse Potential - Risk 2. Patient reports last use in 04/2018, reports she has had periods of abstinence in the past. Patient identifies homelessness, anger and money have been triggers for use. Patient reports history of 1 previous treatment.   Specific goals from treatment plan addressed this week:  Patient reported no urges or use this past week.   Effectiveness of strategies:  Strategies appear effective.  Dimension #6 - Recovery Environment - Risk 3. Patient reports that she is currently in school at [employer] and staying with family members. Patient reports she working on getting into housing with her  through [facility]. Patient reports having  supportive family and peer relationships and hopes to begin to attend NA meetings. Patient reports she is currently on probation through [facility].   Specific goals from treatment plan addressed this week:  Patient reported that she has reduced her class load in order to attend treatment.   Effectiveness of strategies:  Strategies appear effective.  T) Treatment plan updated: no  Patient notified and in agreement: N/A    Patient has completed 15 of 90 program hours at this time. Projected discharge date is 12/4/18. Current discharge plan is TBD.     JONATHAN Dolan, [facility]  10/12/2018, 2:23 PM       Weekly Educational Topics Date   1. Dual Diagnoses, week 1    2. Dual Diagnoses, week 2    3. Stress Management    4. Feelings/Emotions    5. Thinking 9/10, 9/11   6. Change 9/17   7. Recovery Support    8. Relationships/Communication    9. Addiction 101 10/9   10. Relapse Prevention    11. Grief and Loss    12. Strengths    13. Wellness    Mindfulness

## 2021-07-20 NOTE — PROGRESS NOTES
Weekly Progress Note  Lorie Vann  1970  [id number]      D) Pt 3/4 groups this week. No individual sessions were scheduled this week.   A) Staff facilitated groups and reviewed tx progress. Assessed for VA. R) No VAP needed at this time.     Any significant events, defines as events that impact patients relationship with others inside and outside of treatment: No  Indicate any changes or monitoring of physical or mental health problems: No  Indicate involvement by any outside supports: No  IAPP reviewed and modified as needed: NA    Pt working on the following dimensions:  Dimension #1 - Withdrawal Potential - Risk 0. Patient reports last of crack in 4/2018, reports most recent pattern of use has been 1-2x per month, around $100 per time. Patient does not report or display any withdrawal concerns at time of intake.   Specific goals from treatment plan addressed this week:  Patient reported no use this past week. Patient was requested to complete a UA on 10/19/18, which was positive for opiates (consistent with current medications) and ETG/S was positive for alcohol.   Effectiveness of strategies:  Counselor to review results with patient.   Dimension #2 - Biomedical - Risk 1. Patient reports current health concerns of diabetes, neuropathy, carpal tunnel, bulging discs/degenerating disc, HBP. Patient reports having a primary care physician and taking medications as prescribed.    Specific goals from treatment plan addressed this week:  Patient reported illness on 10/22/18, but reported feeling better after that date.   Effectiveness of strategies:  Strategies appear effective.   Dimension #3 - Emotional/Behavioral/Cognitive - Risk 2. Patient reports history of mental health diagnosis of schizophrenia, bipolar and anxiety, and reports current mental health services through [facility]. Patient reports history of mental health hospitalizations in the past, unsure of last date. Patient reports having signficant mental  health symptoms around a month ago, when she was on the streets and had difficulty knowing where she was and what was going on. Patient denies any current SI/HI/SIB.  Active interventions to stabilize mental health symptoms:  Patient reports taking medications, reported meeting with  this week.   Specific goals from treatment plan addressed this week:  Patient discussed things that she does to self-soothe, particularly collecting items. Patient also discussed she has some interest in a grief group and she does not feel that she has moved on from her losses.   Effectiveness of strategies:  Strategies appear effective.  Dimension #4 - Readiness for Change - Risk 1. Patient reports external motivation from probation, but also reports wanting to learn other skills to support her recovery.  Specific goals from treatment plan addressed this week:  Patient attended 3/4 groups, communicated about absence on 10/22/18.   Effectiveness of strategies:  Strategies appear effective.   Dimension #5 - Relapse Potential - Risk 2. Patient reports last use in 04/2018, reports she has had periods of abstinence in the past. Patient identifies homelessness, anger and money have been triggers for use. Patient reports history of 1 previous treatment.   Specific goals from treatment plan addressed this week:  Patient reported no urges or use this past week. Patient identified that she motivated to use other coping skills.   Effectiveness of strategies:  Strategies appear effective, counselor to review UA results with patient.   Dimension #6 - Recovery Environment - Risk 3. Patient reports that she is currently in school at [employer] and staying with family members. Patient reports she working on getting into housing with her  through [facility]. Patient reports having supportive family and peer relationships and hopes to begin to attend NA meetings. Patient reports she is currently on probation through [facility].   Specific goals from treatment plan addressed this week:  Patient discussed meeting with  for housing re-certification.   Effectiveness of strategies:  Strategies appear effective.  T) Treatment plan updated: no  Patient notified and in agreement: N/A    Patient educated on Grief and Loss. Patient has completed 33 of 90 program hours at this time. Projected discharge date is 12/4/18. Current discharge plan is TBD.     JONATHAN Dolan, [facility]  10/25/2018, 11:43 AM       Weekly Educational Topics Date   1. Dual Diagnoses, week 1    2. Dual Diagnoses, week 2    3. Stress Management    4. Feelings/Emotions    5. Thinking 9/10, 9/11   6. Change 9/17   7. Recovery Support    8. Relationships/Communication    9. Addiction 101 10/9   10. Relapse Prevention 10/15, 10/16, 10/17, 10/19   11. Grief and Loss 10/23, 10/24   12. Strengths    13. Wellness    Mindfulness  10/19

## 2021-07-20 NOTE — PROGRESS NOTES
Weekly Progress Note  Judith Vanne  1970  [id number]    D) Pt attended ZERO groups  this week with 3 absences. Patient communicated absences which were related to a job interview, court, and inclement weather. Patient attended ZERO individual sessions this week.   A) Staff facilitated groups and reviewed tx progress. Assessed for VA.   R) Unable to assess along six dimensions or for VA due to lack of attendance.   T) Patient has completed 10 of 90 program hours at this time. Projected discharge date is 5/6/19. Current discharge plan is TBD.     ASHVIN Anne, [facility]  2/21/2019, 8:48 AM

## 2021-07-20 NOTE — PROGRESS NOTES
Weekly Progress Note  Lorie Vann  1970  [id number]      D) Pt attended ZERO groups  this week with 3 unexcused absences. A) Staff facilitated groups and reviewed tx progress. Assessed for VA. R) Unable to assess along six dimensions or for VA due to lack of attendance.   T) Patient has completed 41 of 90 program hours at this time. Due to lack of attendance, patient is discharged AWOL as of 11/7/18, discharge summary to follow.     JONATHAN Dolan, [facility]  11/7/2018, 11:59 AM       Weekly Educational Topics Date   1. Dual Diagnoses, week 1    2. Dual Diagnoses, week 2    3. Stress Management    4. Feelings/Emotions    5. Thinking 9/10, 9/11   6. Change 9/17   7. Recovery Support    8. Relationships/Communication    9. Addiction 101 10/9   10. Relapse Prevention 10/15, 10/16, 10/17, 10/19   11. Grief and Loss 10/23, 10/24   12. Strengths 10/29, 10/30, 10/31   13. Wellness    Mindfulness  10/19
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
## 2021-07-20 NOTE — PROGRESS NOTES
[facility] SUBSTANCE USE DISORDER  DISCHARGE SUMMARY     LATE ENTRY FOR 3/11/19      Name:  Lorie Vann   :  JONATHAN Dolan, JUANJO   Admit Date: 19   Discharge Date: 3/6/19   :  1970   Hours Completed: 10   Initial Diagnosis:  Cocaine Use Disorder, Moderate (F14.20)   Final Diagnosis:  Cocaine Use Disorder, Moderate (F14.20)   Discharge Address:    [address] [place] [address]   Funding Source:    [employer]     Discharge Type:  Absent Without Leave (AWOL)    Patient was receiving residential services at the time of discharge:   No      Reasons for and circumstances of service termination:  Patient completed intake for [facility] on 19, and started group on 19. Patient was excused for absences on 19 and 19 and then attended group of that week. Since the 19, patient has not attended any group and last contacted staff on 19 regarding absences. Due to patients lack of communication and attendance in group, patient is discharged AWOL as of 3/6/19        If program discharge status was At Staff Request, the license lawson must identify the following:    Other interested parties conferred with: N/A    Referrals provided: N/A    Alternatives considered and attempted before deciding to discharge:  N/A       Dimension/Course of Treatment/Individualized Care:   1.  Withdrawal Potential - Intake Risk level -  0 Discharge Risk level - unable to assess at discharge due to lack of contact with patient, last risk ratings assessed on 19: 0  Narrative supporting risk description:  Patient reports last use of cocaine 2018. Patient denies any withdrawal symptoms.   Treatment plan goals and progress towards those goals:  Patient did not indicate any substance while in treatment. Due to short duration of patient's participation in treatment, no UAs were requested.      2.  Biomedical Conditions and Complications - Intake Risk level -  1 Discharge Risk level - unable  to assess at discharge due to lack of contact with patient, last risk ratings assessed on 2/16/19: 1  Narrative supporting risk description:  Patient has several medical conditions. Patient has [employer] insurance. Patient has primary care provider. Patient is able to seek cares as needed.  Treatment plan goals and progress towards those goals:  Patient did not indicate any significant changes to physical health while in outpatient treatment. Patient did not report any progress on smoking cessation.      3.  Emotional/Behavioral/Cognitive Conditions and Complications - Intake Risk level -  2 Discharge Risk level - unable to assess at discharge due to lack of contact with patient, last risk ratings assessed on 2/16/19: 2  Narrative supporting risk description:  Patient reports schizophrenia, anxiety, and bi-polar. Patient does not currently have a mental health care provider but is working with a mental health . Patient denies suicidal and homicidal ideations.   Treatment plan goals and progress towards those goals:  Patient requested assistance with psychiatry referral, patient was encouraged to reach out to her  and was also provided a few names of local providers. Due to short duration of treatment participation, no other progress towards treatment goals.      4.  Readiness for Change - Intake Risk level -  0 Discharge Risk level - unable to assess at discharge due to lack of contact with patient, last risk ratings assessed on 2/16/19: 0  Narrative supporting risk description:  Patient verbalized readiness and willingness for change, but also displayed inconsistency in attendance and communication regarding absences.   Treatment plan goals and progress towards those goals:  Patient last attended group on 2/12/19, and last communicated with staff regarding absences on 2/26/19, not meeting the expectations of attending 4, 3-hour groups per week and communicating with staff regarding all  "absences.      5.  Relapse/Continued Use/Continued Problem Potential - Intake Risk level -  3 Discharge Risk level - unable to assess at discharge due to lack of contact with patient, last risk ratings assessed on 2/26/19: 3  Narrative supporting risk description:  Patient reported abstinence since 4/2018, reports history of relapse in the past, two previous treatment episodes.   Treatment plan goals and progress towards those goals:  Patient reported continued abstinence while engaged in treatment services. No other progress towards goals.      6.  Recovery Environment - Intake Risk level -  3 Discharge Risk level - unable to assess at discharge due to lack of contact with patient, last risk ratings assessed on 2/26/19: 3  Narrative supporting risk description:  Patient is unemployed,receives disability. Patient does not have stable housing. Patient reports she has a support system. Patient has some structured daily activity. Patient is currently on probation with [facility]. Patient has significant criminal history.  Treatment plan goals and progress towards those goals:  Patient discussed planning to attend recovery meetings, but did not report attending any groups. Patient discussed that she hoped to find a better plan for housing.      Strengths identified as \"God, family, grandkids\"  Needs identified as \"not stable with housing or mental state\"  Services Provided: Intake, assessment, treatment planning, education, group discussion, film, lectures, 1x1 therapy, and recommendations at discharge.        Program Involvement: Fair  Attendance: Poor  Ability to relate in group/   Other program activities: Fair  Assignment Completion: Poor  Overall Behavior: Fair  Reported Family/Significant   Other Involvement: Fair    Prognosis: Fair      Recommendations       Patient to complete chemical health assessment to determine appropriate level of care.     Mental Health Referral  Med Compliance      Physical Health " Referral:  Patient is referred to her primary care physician.          Counselor Name and Title:  JONATHAN Dolan, [facility] [place]        Date:  3/12/2019  Time:  2:06 PM

## 2021-07-20 NOTE — ADDENDUM NOTE
Addendum Note by Sharon Maldonado at 4/29/2020  3:00 PM     Author: Sharon Maldonado Service: -- Author Type: --    Filed: 5/20/2020 10:04 AM Date of Service: 4/29/2020  3:00 PM Status: Signed    : Sharon Maldonado    Encounter addended by: Sharon Maldonado on: 5/20/2020 10:04 AM      Actions taken: Charge Capture section accepted

## 2021-07-20 NOTE — PROGRESS NOTES
Weekly Progress Note  Lorie Vann  1970  [id number]      D) Pt attended 0 groups this week with excused absences. Patient attended 1 individual sessions this week to complete intake and brief DA.   A) Staff facilitated groups and reviewed tx progress. Assessed for [facility]. R) No VAP needed at this time.     Any significant events, defines as events that impact patients relationship with others inside and outside of treatment: No  Indicate any changes or monitoring of physical or mental health problems: No  Indicate involvement by any outside supports: No  IAPP reviewed and modified as needed: NA    Pt working on the following dimensions:  Dimension #1 - Withdrawal Potential - Risk 0. Patient reports last of crack in 4/2018, reports most recent pattern of use has been 1-2x per month, around $100 per time. Patient does not report or display any withdrawal concerns at time of intake.   Specific goals from treatment plan addressed this week:  Treatment plan has not been developed.   Effectiveness of strategies:  N/A   Dimension #2 - Biomedical - Risk 1. Patient reports current health concerns of diabetes, neuropathy, carpal tunnel, bulging discs/degenerating disc, HBP. Patient reports having a primary care physician and taking medications as prescribed.    Specific goals from treatment plan addressed this week:  Treatment plan has not been developed.   Effectiveness of strategies:  N/A   Dimension #3 - Emotional/Behavioral/Cognitive - Risk 2. Patient reports history of mental health diagnosis of schizophrenia, bipolar and anxiety, and reports current mental health services through [facility]. Patient reports history of mental health hospitalizations in the past, unsure of last date. Patient reports having signficant mental health symptoms around a month ago, when she was on the streets and had difficulty knowing where she was and what was going on. Patient denies any current SI/HI/SIB. .  Active interventions to stabilize  mental health symptoms:  Patient reports taking medications.   Specific goals from treatment plan addressed this week:  Treatment plan has not been developed.   Effectiveness of strategies:  N/A   Dimension #4 - Readiness for Change - Risk 1. Patient reports external motivation from probation, but also reports wanting to learn other skills to support her recovery.  Specific goals from treatment plan addressed this week:  Treatment plan has not been developed.   Effectiveness of strategies:  N/A   Dimension #5 - Relapse Potential - Risk 2. Patient reports last use in 04/2018, reports she has had periods of abstinence in the past. Patient identifies homelessness, anger and money have been triggers for use. Patient reports history of 1 previous treatment.   Specific goals from treatment plan addressed this week:  Treatment plan has not been developed.   Effectiveness of strategies:  N/A   Dimension #6 - Recovery Environment - Risk 3. Patient reports that she is currently in school at [employer] and staying with family members. Patient reports she working on getting into housing with her  through [facility]. Patient reports having supportive family and peer relationships and hopes to begin to attend NA meetings. Patient reports she is currently on probation through [facility].   Specific goals from treatment plan addressed this week:  Treatment plan has not been developed.   Effectiveness of strategies:  N/A  T) Treatment plan updated: no.  Patient notified and in agreement: NA.   Patient has completed 1 of 90 program hours at this time. Projected discharge date is TBD. Current discharge plan is TBD.     JONATHAN Dolan, [facility]  9/7/2018, 4:35 PM       Weekly Educational Topics Date   1. Dual Diagnoses, week 1    2. Dual Diagnoses, week 2    3. Stress Management    4. Feelings/Emotions    5. Thinking    6. Change    7. Recovery Support    8. Relationships/Communication    9. Addiction 101    10.  Relapse Prevention    11. Grief and Loss    12. Strengths    13. Wellness    Mindfulness

## 2021-07-20 NOTE — TELEPHONE ENCOUNTER
Controlled Substance Refill Request  Medication:   Requested Prescriptions     Pending Prescriptions Disp Refills     traMADol (ULTRAM) 50 mg tablet 30 tablet 0     Date Last Fill: 7/17/17  Pharmacy: [facility]   Submit electronically to pharmacy  Controlled Substance Agreement on File:   Encounter-Level CSA Scan Date:    There are no encounter-level csa scan date.       Last office visit: Last office visit pertaining to requested medication was 3/22/19.

## 2021-07-20 NOTE — PROGRESS NOTES
Patient completed Diagnostic Assessment for Mental Health on 9/4/18 with  JONATHAN Dolan LADC. Diagnoses at that time were:   Schizoaffective disorder, bipolar type  Generalized anxiety disorder  Cocaine use disorder, moderate .     Assessment was reviewed by [facility] staff.     JONATHAN Dolan LADC  2/7/2019, 9:39 AM

## 2021-07-20 NOTE — PROGRESS NOTES
NP is being seen today by Yariel Alba MD, for consultation of 8-constant, sharp, achy back and lt knee pain.  F6

## 2021-07-20 NOTE — PROGRESS NOTES
Weekly Progress Note  Judith Vnane  1970  [id number]      D) Pt attended 1/4 groups this week with 2 excused and 1 unexcused absences. No individual sessions were scheduled this week.   A) Staff facilitated groups and reviewed tx progress. Assessed for [facility]. R) No VAP needed at this time.     Any significant events, defines as events that impact patients relationship with others inside and outside of treatment: No  Indicate any changes or monitoring of physical or mental health problems: No  Indicate involvement by any outside supports: No  IAPP reviewed and modified as needed: NA    Pt working on the following dimensions:  Dimension #1 - Withdrawal Potential - Risk 0. Patient reports last of crack in 4/2018, reports most recent pattern of use has been 1-2x per month, around $100 per time. Patient does not report or display any withdrawal concerns at time of intake.   Specific goals from treatment plan addressed this week:  Patient reported no use this past week. Patient was not requested to complete any UAs.   Effectiveness of strategies:  Strategies appear effective.  Dimension #2 - Biomedical - Risk 1. Patient reports current health concerns of diabetes, neuropathy, carpal tunnel, bulging discs/degenerating disc, HBP. Patient reports having a primary care physician and taking medications as prescribed.    Specific goals from treatment plan addressed this week:  Patient reported no changes to physical health this week.   Effectiveness of strategies:  Strategies appear effective.   Dimension #3 - Emotional/Behavioral/Cognitive - Risk 2. Patient reports history of mental health diagnosis of schizophrenia, bipolar and anxiety, and reports current mental health services through [facility]. Patient reports history of mental health hospitalizations in the past, unsure of last date. Patient reports having signficant mental health symptoms around a month ago, when she was on the streets and had difficulty knowing where she  was and what was going on. Patient denies any current SI/HI/SIB.  Active interventions to stabilize mental health symptoms:  Patient reports taking medications, reported meeting with  this week,   Specific goals from treatment plan addressed this week:  Patient reported no changes to mental health symptoms. Patient was able to identify how tiny habits can help her pursue her goals.   Effectiveness of strategies:  Strategies appear effective.  Dimension #4 - Readiness for Change - Risk 1. Patient reports external motivation from probation, but also reports wanting to learn other skills to support her recovery.  Specific goals from treatment plan addressed this week:  Patient attended 1/4 groups this week, reported needing to miss group on 9/18/18 and 9/19/18, but did not communicate about 9/21/18.   Effectiveness of strategies:  Counselor to meet with patient to develop treatment participation and attendance agreement. .   Dimension #5 - Relapse Potential - Risk 2. Patient reports last use in 04/2018, reports she has had periods of abstinence in the past. Patient identifies homelessness, anger and money have been triggers for use. Patient reports history of 1 previous treatment.   Specific goals from treatment plan addressed this week:  Patient reported no urges or use this past week.   Effectiveness of strategies:  Strategies appear effective.  Dimension #6 - Recovery Environment - Risk 3. Patient reports that she is currently in school at [employer] and staying with family members. Patient reports she working on getting into housing with her  through [facility]. Patient reports having supportive family and peer relationships and hopes to begin to attend NA meetings. Patient reports she is currently on probation through [facility].   Specific goals from treatment plan addressed this week:  Patient discussed being in school and how being in school is related to her goals.   Effectiveness  of strategies:  Strategies appear effective.  T) Treatment plan updated: no  Patient notified and in agreement: N/A    Patient has completed 8 of 90 program hours at this time. Projected discharge date is 12/4/18. Current discharge plan is TBD.     JONATHAN Dolan, [facility]  9/22/2018, 12:38 PM       Weekly Educational Topics Date   1. Dual Diagnoses, week 1    2. Dual Diagnoses, week 2    3. Stress Management    4. Feelings/Emotions    5. Thinking 9/10, 9/11   6. Change 9/17   7. Recovery Support    8. Relationships/Communication    9. Addiction 101    10. Relapse Prevention    11. Grief and Loss    12. Strengths    13. Wellness    Mindfulness

## 2021-07-20 NOTE — TELEPHONE ENCOUNTER
"Called and left message for pt to return call.# 3  \" Okay to relay message\"  Attempted 3 times okay to send letter?  "

## 2021-07-20 NOTE — PROGRESS NOTES
Subjective:     Lorie Vann is a 47 y.o. female who presents for an annual exam.     Other concerns today:  She needs to establish care.  She has previously been going to multiple other health care systems.  Records requested and reviewed through care everywhere today.  She notes she is homeless.  She has a  or  that is working with her.  She just recently had right rotator cuff surgery.  She feels like she is healing well, but shoulder is still sore and range of motion is limited.  She has follow-up postop visit next week, [facility].  She reports she is not working, she is on disability.    1.  Schizoaffective disorder bipolar type.  She reports she is seeing psychiatry, taking medicines regularly.    2.  STD screen.  She is having some vaginal itching and thinks she may have a yeast infection.    3.  Type 2 diabetes with neurologic complication.    She has peripheral neuropathy.  Taking diabetes medicines as reported in med list.  Most recent A1c was 7.5% in February.    4.  Chronic pain.    She reports she is seeing a pain clinic.    5.  History of abnormal Pap smear.  ASCUS with HPV negative in 2017, ASCUS with HPV positive in 2014.  Past history of colposcopy.    6.  Nicotine dependence.  She is smoking 2-3 cigarettes a day.    Immunization History   Administered Date(s) Administered     Hep A, historic 02/11/2009, 03/11/2009     Hep B, historic 02/11/2009, 03/11/2009, 04/12/2016     Influenza, inj, historic,unspecified 10/20/2009, 08/31/2010, 08/27/2012, 09/26/2013, 09/23/2014, 09/23/2015     Pneumo Polysac 23-V 02/11/2009, 03/11/2010     Tdap 08/31/2010     Gynecologic History  No LMP recorded. Patient is not currently having periods (Reason: Perimenopausal).  Contraception: none  Last Pap: 2017  Last mammogram: 12/26/16. Results were: normal      Current Outpatient Prescriptions:      acetaminophen (TYLENOL) 325 MG tablet, Take 1 tablet (325 mg total) by mouth 4 (four)  times a day., Disp: 120 tablet, Rfl: 0     ammonium lactate (LAC-HYDRIN) 12 % lotion, Apply topically., Disp: , Rfl:      atorvastatin (LIPITOR) 20 MG tablet, Take 20 mg by mouth daily., Disp: , Rfl:      blood glucose test (ACCU-CHEK SMARTVIEW TEST STRIP) strips, Use 1 each As Directed 3 (three) times a day. Dispense brand per patient's insurance at pharmacy discretion., Disp: 100 each, Rfl: 11     cetirizine (ZYRTEC) 10 MG tablet, Take 10 mg by mouth daily., Disp: , Rfl:      divalproex (DEPAKOTE) 250 MG EC tablet, Take 250 mg by mouth 3 (three) times a day., Disp: , Rfl:      dulaglutide 1.5 mg/0.5 mL PnIj, Inject 1.5 mg under the skin once a week., Disp: , Rfl:      fluticasone (FLONASE) 50 mcg/actuation nasal spray, 2 sprays daily., Disp: , Rfl:      gabapentin (NEURONTIN) 300 MG capsule, TAKE 3 CAPSULES BY MOUTH THREE TIMES DAILY, Disp: 270 capsule, Rfl: 0     generic lancets, 3 times daily. Test 3 times per day., Disp: , Rfl:      glipiZIDE (GLUCOTROL) 10 MG tablet, Take 10 mg by mouth daily., Disp: , Rfl:      ibuprofen (ADVIL,MOTRIN) 800 MG tablet, Take 800 mg by mouth 3 (three) times a day as needed., Disp: , Rfl:      lidocaine (XYLOCAINE) 5 % ointment, APPLY TOPICALLY TO AFFECTED AREA(S) TWICE DAILY AS NEEDED, Disp: 50 g, Rfl: 0     lisinopril (PRINIVIL,ZESTRIL) 20 MG tablet, Take 20 mg by mouth daily., Disp: , Rfl:      omeprazole (PRILOSEC) 20 MG capsule, Take 20 mg by mouth daily., Disp: , Rfl:      prochlorperazine (COMPAZINE) 10 MG tablet, TAKE 1 TABLET BY MOUTH EVERY 6 HOURS AS NEEDED FOR NAUSEA OR VOMITING, Disp: 20 tablet, Rfl: 0     QUEtiapine (SEROQUEL) 100 MG tablet, Take 100 mg by mouth 2 (two) times a day., Disp: , Rfl:      sitaGLIPtin (JANUVIA) 100 MG tablet, Take 100 mg by mouth daily., Disp: , Rfl:      traMADol (ULTRAM) 50 mg tablet, TAKE 1 TABLET(50 MG) BY MOUTH DAILY AS NEEDED, Disp: 30 tablet, Rfl: 0     traZODone (DESYREL) 100 MG tablet, Take 100 mg by mouth at bedtime., Disp: ,  Rfl:      aspirin 81 MG EC tablet, Take by mouth., Disp: , Rfl:      fluconazole (DIFLUCAN) 150 MG tablet, Take 1 tablet (150 mg total) by mouth once for 1 dose., Disp: 1 tablet, Rfl: 0     nicotine polacrilex (NICORETTE) 4 MG gum, Take 4 mg by mouth., Disp: , Rfl:   Past Medical History:   Diagnosis Date     Anemia      Cervical cancer      Chronic headaches      Cocaine abuse in remission      Depression      History of psychiatric disorder     requiring hospitalization     No past surgical history on file.  Review of patient's allergies indicates no known allergies.  Family History   Problem Relation Age of Onset     Diabetes Mother      Hypertension Mother      Arthritis Mother      Diabetes type II Mother      Diabetes Father      Diabetes Sister      Thyroid disease Sister      Diabetes Maternal Grandmother      Heart disease Maternal Grandfather      Acute Myocardial Infarction Other      Breast cancer Other      Stroke Other      Social History     Social History     Marital status: Single     Spouse name: N/A     Number of children: 10     Years of education: N/A     Occupational History     Not on file.     Social History Main Topics     Smoking status: Current Some Day Smoker     Types: Cigarettes     Last attempt to quit: 9/21/2015     Smokeless tobacco: Never Used      Comment: 1 pack a week     Alcohol use No     Drug use: No     Sexual activity: Yes     Partners: Male     Other Topics Concern     Not on file     Social History Narrative    She walks, swims, and weight lifts 2x weekly. Volunteers for [employer], [employer], and [employer]. She has 3 male children between the ages of 10-31 and 7 female children between the ages of 16-30.      Review of Systems  Complete Review of Systems is discussed with patient and is negative except as noted in HPI.    Objective:     Vitals:    04/11/18 1217   BP: 136/90   Pulse: 78   Resp: 16   Temp: 97.2  F (36.2  C)   SpO2: 99%     Body mass index is 32.04  kg/(m^2).    Physical Exam:  General: Patient is alert and Oriented x 3, in no apparent distress.  Appropriately groomed with normal affect.  HEENT, Thyroid, Lymphatic, Pulmonary, GI, Musculoskeletal, and Neuro exams were completed today and grossly normal.  Breast Exam: No lumps, skin changes, lymphadenopathy, or nipple discharge noted bilaterally.  Of note, right breast exam was unable to be fully completed, due to patient's inability to raise her arm.  Genitourinary Exam: External genitalia is normal in appearance, vaginal walls are healthy and without lesions, no significant discharge noted in the vaginal vault, cervix is well visualized and normal in appearance.  Pap was taken without difficulty.  Musculoskeletal exam did have right shoulder pain and decreased range of motion of the right shoulder, status post surgery last week.  Cardiac: Irregularly irregular rhythm, normal rate, no murmurs rubs or gallops  Diabetic Foot Exam:  Normal pedal pulses bilaterally.  Skin appears healthy and without significant injury.  Sensation is slightly decreased to monofilament bilaterally.    Results for orders placed or performed in visit on 04/11/18   Wet Prep, Vaginal   Result Value Ref Range    Yeast Result No yeast seen No yeast seen    Trichomonas No Trichomonas seen No Trichomonas seen    Clue Cells, Wet Prep No Clue cells seen No Clue cells seen   Pregnancy (Beta-hCG, Qual), Urine   Result Value Ref Range    Pregnancy Test, Urine Negative Negative    Specific Gravity, UA >=1.030 1.001 - 1.030   Other labs pending.    Assessment and Plan:     1. Annual physical exam  Health Maintenance discussed with patient as appropriate for age and risk factors.  Old records requested through care everywhere and reviewed today.  Med list reconciled to the best of my abilities.  Family history of breast cancer, updated in her chart.  She believes she may be due for mammogram.  - Gynecologic Cytology (PAP Smear)    2. Schizoaffective  disorder, bipolar type  She is seeing psychiatry.  She reports she is taking her psychiatric medicines regularly.  PHQ 9 = 17.    3. Type 2 diabetes mellitus with neurological manifestations  Taking glipizide, Januvia, and Trulicity.  She lost her glucometer needs a refill.  That was sent today.  Referral placed for diabetic education.  Last A1c = 7.5% on 2/14/18.  - Ambulatory referral to Ophthalmology    4. Screen for STD (sexually transmitted disease)  Prescription sent for fluconazole to treat for yeast infection.  I will follow-up with remainder of labs.  - Wet Prep, Vaginal  - Chlamydia trachomatis & Neisseria gonorrhoeae, Amplified Detection  - HIV Antigen/Antibody Screening [place]  - Syphilis Screen, [place]    5. Hypertension with goal blood pressure less than 140/90  Patient is taking lisinopril.  Screening labs done recently through care everywhere grossly normal.    6. Abnormal Pap smear of cervix  Screening Pap completed today.  I will update patient with results when available.  - Gynecologic Cytology (PAP Smear)    7. Tobacco dependence  Smoking 2-3 cigarettes a day.  She is in the pre-contemplative phase for quitting.    8. Irregular menses  Pregnancy test negative today.  She notes she has a history of irregular periods.  This could be multifactorial, along with perimenopause.  She is not using anything for birth control right now.  This should be addressed at a follow-up visit.  - Pregnancy (Beta-hCG, Qual), Urine    9. Chronic Pain  States she is following with a pain clinic.    10. Peripheral neuropathy  Some decreased sensation on diabetic foot exam today.  She is taking gabapentin.    11.  Environmental stressors.  Patient is currently homeless.  She does have a  or  that is working with her.    12.  Possible Cardiac arrhythmia.  Auscultation showed irregularly irregular heartbeat today.  Two previous EKGs, in 2016 and 20017, report normal sinus rhythm.  I was unable to  find mention of this in previous notes.  Asymptomatic presently.  Plan on re-evaluation at follow-up visit soon.    This dictation uses voice recognition software, which may contain typographical errors.

## 2021-07-20 NOTE — PROGRESS NOTES
Assessment/Plan:     Problem List Items Addressed This Visit     None      Visit Diagnoses     Chronic pain syndrome    -  Primary    Relevant Medications    lamoTRIgine (LAMICTAL) 25 MG tablet    Other Relevant Orders    Ambulatory referral to Physical Therapy    Vitamin D, Total (25-Hydroxy)    HLA DQB1 for Celiac Disease    Celiac(Gluten)Antibody Panel    C-Reactive Protein (CRP)    Glycosylated Hemoglobin A1C            No follow-ups on file.    Patient Instructions   PLAN:     Sign ZIGGY for Dr. Alba to speak to therapist      Come back Thursday for labs    Do not  Gabapentin    Discussed starting Lamictal for nerve pain 25mg one daily for two weeks, one twice a day for two weeks, then two morning and one bedtime    Referral to Physical therapy for learning TENS unit    Return in 4-6 weeks        Subjective:       48 y.o. female presents for evaluation of peripheral neuropathy, referred from primary care provider Dr. Gunter.    48-year-old female living in [place] with her daughter, having 14 children.  She has been on disability since 2009.      Dr. Gunter's 3/22/2019 note indicates having a history of left knee pain for several weeks, carpal tunnel syndrome both hands, history of right rotator cuff pain with surgery through orthopedics and completing physical therapy.    On interview she reviews her main concern has to do with diabetic neuropathy which she has had for 7 years, noting that sometimes her feet are tender to the touch, can be tingling and numb up to her shins.  She also understands she has had disc problems with sciatica.    She noted back problems since 2003 having had injections which did not help.  She describes low back pain down her back of her left leg is constant.  There is sometimes spasms.  It is worse with sitting.  Physical therapy and TENS unit did not help.  She has been told she has arthritis in her back.  Gabapentin did not help.    She had previously been seen in the  [facility] center in 2015, urine drug screen at that time was negative for oxycodone which was prescribed, positive for alcohol, and did not show for a pill count.    She reports sleep is poor.  Her appetite is fair and her weight is stable 198.  Her energy varies.  Her mood is fairly good.    She acknowledges a history of bipolar disorder for which she is taking Depakote 250 mg 3 times a day to help her mood be stable, Seroquel 100 mg at night.  She will be seen in new psychiatrist in July and has a therapist at [facility].  She notes support in her family and children.    Substance usage it does not use alcohol.  She does smoke cigarettes 1-2 a day and she is trying to quit.  Has not used marijuana or cocaine.  She reports being on probation where she gives urine samples, follow through [facility].    Medical history: Reports her diabetes is in good control now    Current Outpatient Medications:      acetaminophen (TYLENOL) 325 MG tablet, Take 1 tablet (325 mg total) by mouth 4 (four) times a day., Disp: 120 tablet, Rfl: 5     aspirin 81 MG EC tablet, Take 1 tablet (81 mg total) by mouth daily., Disp: 30 tablet, Rfl: 11     atorvastatin (LIPITOR) 20 MG tablet, Take 1 tablet (20 mg total) by mouth daily., Disp: 30 tablet, Rfl: 3     blood glucose meter (GLUCOMETER), Use 1 each As Directed as needed. Dispense glucometer brand per patient's insurance at pharmacy discretion., Disp: 1 each, Rfl: 0     blood glucose test (ACCU-CHEK SMARTVIEW TEST STRIP) strips, Use 1 each As Directed 3 (three) times a day. Dispense brand per patient's insurance at pharmacy discretion., Disp: 100 each, Rfl: 11     cetirizine (ZYRTEC) 10 MG tablet, Take 1 tablet (10 mg total) by mouth daily., Disp: 30 tablet, Rfl: 5     divalproex (DEPAKOTE DR) 250 MG 12 hour tablet, Take 1 tablet (250 mg total) by mouth 3 (three) times a day., Disp: 180 tablet, Rfl: 5     dulaglutide (TRULICITY) 1.5 mg/0.5 mL PnIj, Inject 1.5 mg under the skin  once a week., Disp: 4 Syringe, Rfl: 3     fluticasone (FLONASE) 50 mcg/actuation nasal spray, 2 sprays into each nostril daily., Disp: 16 g, Rfl: 5     gabapentin (NEURONTIN) 300 MG capsule, Take 1 capsule (300 mg total) by mouth 3 (three) times a day., Disp: 90 capsule, Rfl: 3     generic lancets, 3 times daily. Test 3 times per day., Disp: , Rfl:      ibuprofen (ADVIL,MOTRIN) 800 MG tablet, Take 1 tablet (800 mg total) by mouth every 8 (eight) hours as needed for pain or fever. Take with food., Disp: 60 tablet, Rfl: 0     lamoTRIgine (LAMICTAL) 25 MG tablet, One bedtime for two weeks, one twice a day for two weeks, then two morning and one bedyime, Disp: 60 tablet, Rfl: 3     lisinopril (PRINIVIL,ZESTRIL) 20 MG tablet, Take 1 tablet (20 mg total) by mouth daily., Disp: 30 tablet, Rfl: 3     omeprazole (PRILOSEC) 20 MG capsule, Take 1 capsule (20 mg total) by mouth daily., Disp: 30 capsule, Rfl: 3     QUEtiapine (SEROQUEL) 100 MG tablet, Take 1 tablet (100 mg total) by mouth 2 (two) times a day., Disp: 60 tablet, Rfl: 5     sitaGLIPtin (JANUVIA) 100 MG tablet, Take 1 tablet (100 mg total) by mouth daily., Disp: 30 tablet, Rfl: 3     terbinafine HCl (LAMISIL) 1 % cream, Apply topically to affected areas BID, Disp: 45 g, Rfl: 0     traMADol (ULTRAM) 50 mg tablet, TAKE 1 TABLET BY MOUTH EVERY DAY AS NEEDED., Disp: 30 tablet, Rfl: 0     traZODone (DESYREL) 100 MG tablet, Take 1 tablet (100 mg total) by mouth at bedtime., Disp: 30 tablet, Rfl: 3  No Known Allergies  Developmental history raised in [place] the 18th of 18 children.  Reports having a good childhood.  She moved to [place] to complete high school.  She earned an AA degree in business and would like to go back to school.  Hobbies include drawing.  She is not involved in Hinduism.    On exam she is alert with a clear sensorium good eye contact.  Thought process tight logical.  Speech is normal rate and rhythm.  No extraparametal side effects noted.    On exam she  "is tender over the lower thoracic area to palpation.  Low back and sacroiliac joint nontender.    On straight leg raising her left leg is positive for increased report of pain.  Deep tendon reflexes are equal.    Romberg is negative.          Objective:     Vitals:    05/14/19 1251   BP: 122/58   Pulse: 80   Weight: 198 lb (89.8 kg)   Height: 5' 5.25\" (1.657 m)   PainSc:   8   PainLoc: Back     Impression: Patient reports that history of pain related to peripheral neuropathy secondary to diabetes.  Complains of low back pain.    On this exam did not comment on her knee pain.    There is a comorbid history of bipolar spectrum disorder or schizoaffective disorder.  Will report stable on her present medication.    I previously seen in the clinic with a negative urine drug screen for prescribed oxycodone, positive for alcohol, did not show for pill count.    Plan, we will obtain baseline laboratories.  She states she needs to return at another time.    She will sign a release of information for me to speak to her therapist to get some perspective.    She had been recently prescribed gabapentin though did not pick it up.  Discussed the use of lamotrigine which may be agent with a different mechanism of action which may be more helpful for her symptoms, including suspected sent central sensitization.  Usual side effects discussed.    Discussed the use of a TENS unit which she would like to explore.    Time spent more than 45 minutes face-to-face, 50% count about above condition coordination treatment plan          This note has been dictated using voice recognition software. Any grammatical or context distortions are unintentional and inherent to the software  "

## 2021-07-20 NOTE — PATIENT INSTRUCTIONS - HE
PLAN:    At check-out schedule with Dr. Carrington for evaluation for spinal cord stimulator    Sign ZIGGY for Dr. Alba to speak to Rani, mental health worker, and Casey Todd, your new Psychiatrist    Lamotrigine for pain, 25 mg one daily for two weeks, one twice a day for two weeks, then two morning and one bedtime    Return in 6 weeks

## 2021-07-20 NOTE — TELEPHONE ENCOUNTER
Called and left pt a message to call clinic back. Clinic number provided. Upon returning called okay to relay India Forde MD's message below.   Amanuel Youngblood, [address] [address]  PH: [phone]

## 2021-07-20 NOTE — PROGRESS NOTES
Assessment/Plan:     Problem List Items Addressed This Visit     Chronic Pain    Peripheral neuropathy    Relevant Medications    lamoTRIgine (LAMICTAL) 25 MG tablet    Other Relevant Orders    Vitamin D, Total (25-Hydroxy)    HLA DQB1 for Celiac Disease    Celiac(Gluten)Antibody Panel      Other Visit Diagnoses     Chronic pain syndrome    -  Primary            No follow-ups on file.    Patient Instructions   PLAN:    At check-out schedule with Dr. Carrington for evaluation for spinal cord stimulator    Sign ZIGGY for Dr. Alba to speak to Rani, mental health worker, and Nnamdi Toddrom, your new Psychiatrist    Lamotrigine for pain, 25 mg one daily for two weeks, one twice a day for two weeks, then two morning and one bedtime    Return in 6 weeks            Subjective:       49 y.o. female presents for of lumbar radiculopathy, and peripheral neuropathy.    She was a no-show in August and December.  Reviewing the records she had been hospitalized in August, urine positive for cocaine and heroin.  She was going to [place] on Suboxone.    She reviews today that she is planning to start school for construction, in March.  She is looking forward to this.  She is concerned as he continues to have pain from her back down her left leg.  Also continues with the peripheral neuropathy.    This is been a problem for many years.  Reviewing the records she has had medial branch blocks back in 2015.  He did not think they were particularly helpful.  She has had TENS units.  She has tried gabapentin and Lyrica.    She acknowledges had relapse of cocaine and heroin.  She had been homeless and missing appointments.  She has now been living consistently at the shelter.  She has a new mental health worker named Tala [phone].  She is to establish with a new psychiatrist, Dr. De Souza, at [facility]'s next month.  She continues on Seroquel.    She continues going to the [place] for Suboxone on 24 mg daily.  Not  particularly helping with peripheral neuropathy.    When last seen in May I had recommended trial of lamotrigine to help with the peripheral neuropathy and physical therapy.  States she did not ever try the lamotrigine or go to the physical therapy.    Reports her diabetes is under fair control.      describes as work in the past with a , and took some other classes.  She has goals when she completes her training.      Current Outpatient Medications:      aspirin 81 MG EC tablet, Take 1 tablet (81 mg total) by mouth daily., Disp: 30 tablet, Rfl: 11     atorvastatin (LIPITOR) 20 MG tablet, Take 1 tablet (20 mg total) by mouth daily., Disp: 30 tablet, Rfl: 3     blood glucose meter (GLUCOMETER), Use 1 each As Directed as needed. Dispense glucometer brand per patient's insurance at pharmacy discretion., Disp: 1 each, Rfl: 0     cetirizine (ZYRTEC) 10 MG tablet, Take 1 tablet (10 mg total) by mouth daily., Disp: 30 tablet, Rfl: 5     dulaglutide (TRULICITY) 1.5 mg/0.5 mL PnIj, Inject 1.5 mg under the skin once a week., Disp: 4 Syringe, Rfl: 3     escitalopram oxalate (LEXAPRO) 10 MG tablet, Take 1 tablet (10 mg total) by mouth daily., Disp: 30 tablet, Rfl: 0     fluticasone propionate (FLONASE) 50 mcg/actuation nasal spray, 2 sprays into each nostril daily., Disp: 16 g, Rfl: 0     generic lancets, 3 times daily. Test 3 times per day., Disp: , Rfl:      lisinopril (PRINIVIL,ZESTRIL) 20 MG tablet, Take 1 tablet (20 mg total) by mouth daily., Disp: 30 tablet, Rfl: 3     omeprazole (PRILOSEC) 20 MG capsule, Take 1 capsule (20 mg total) by mouth daily., Disp: 30 capsule, Rfl: 3     sitaGLIPtin (JANUVIA) 100 MG tablet, Take 1 tablet (100 mg total) by mouth daily., Disp: 30 tablet, Rfl: 3     traZODone (DESYREL) 100 MG tablet, Take 1 tablet (100 mg total) by mouth at bedtime., Disp: 30 tablet, Rfl: 3     lamoTRIgine (LAMICTAL) 25 MG tablet, One daily for two weeks, one twice a day two weeks, then two morning  "and one bedtime, Disp: 60 tablet, Rfl: 3  She is alert with a clear sensorium.  Good eye contact.  Speech is normal rate and rhythm.  Affect is somewhat constricted, does not have psychomotor agitation or retardation.    There is no significant pain behavior.          Objective:     Vitals:    02/17/20 1427   BP: 127/79   Pulse: 82   Weight: 198 lb (89.8 kg)   Height: 5' 4\" (1.626 m)   PainSc:   9   PainLoc: Back         Impression, history of known left lumbar pain with radiculopathy, with our records having injections dating back to 2014.  Also peripheral neuropathy possibly related to diabetic.    There is been comorbid bipolar disorder, substance abuse.  She reports these issues are more stable.    Plan: Reviewed with her symptoms she may benefit from a trial of a spinal cord stimulator.  He is tried conservative treatment in a number of neuropathic medications.    The concern would be her stability of her mental health and substance use though she reports now having an active , more stable housing for the situation is be to gauge in treatment.    She will sign a release information for me to speak to her mental health worker, and collaborate with her new psychiatrist.    In the interim she will begin a trial of lamotrigine to help with neuropathic pain and radiculopathy.    Time spent more than 25 minutes face-to-face, 50% counts about above condition coronation treatment plan    2/18, call to Julian, no record of pt with pending appt      This note has been dictated using voice recognition software. Any grammatical or context distortions are unintentional and inherent to the software  "

## 2021-07-20 NOTE — PROGRESS NOTES
Lorie Vann attended 3 hours of group therapy today.    Total group size of 8.    2/13/2019 2:02 PM Augustina Leblanc

## 2021-07-20 NOTE — TELEPHONE ENCOUNTER
Duplicate message. Please review 3/22/2019 for more details.     Patient stated she is taking all the medications on the list     She also wants a refill of Tramadol 60 mg she takes it three times a day.

## 2021-07-20 NOTE — PROGRESS NOTES
Lorie Vann attended 3 hours of group therapy today.    Total group size of 11.    10/30/2018 11:58 AM Augustina Leblanc

## 2021-07-20 NOTE — PROGRESS NOTES
Weekly Progress Note  VannJudithe  1970  [id number]      D) Pt 3/3 groups this week. No individual sessions were scheduled this week.   A) Staff facilitated groups and reviewed tx progress. Assessed for [facility]. R) No VAP needed at this time.     Any significant events, defines as events that impact patients relationship with others inside and outside of treatment: No  Indicate any changes or monitoring of physical or mental health problems: No  Indicate involvement by any outside supports: No  IAPP reviewed and modified as needed: NA    Pt working on the following dimensions:  Dimension #1 - Withdrawal Potential - Risk 0. Patient reports last of crack in 4/2018, reports most recent pattern of use has been 1-2x per month, around $100 per time. Patient does not report or display any withdrawal concerns at time of intake.   Specific goals from treatment plan addressed this week:  Patient reported no use this past week. Patient was not requested to complete any UAs.   Effectiveness of strategies:  Strategies appear effective.  Dimension #2 - Biomedical - Risk 1. Patient reports current health concerns of diabetes, neuropathy, carpal tunnel, bulging discs/degenerating disc, HBP. Patient reports having a primary care physician and taking medications as prescribed.    Specific goals from treatment plan addressed this week:  Patient continuing to feel ill this week, reported having a doctor's appointment scheduled for 10/18/18.   Effectiveness of strategies:  Strategies appear effective.   Dimension #3 - Emotional/Behavioral/Cognitive - Risk 2. Patient reports history of mental health diagnosis of schizophrenia, bipolar and anxiety, and reports current mental health services through [facility]. Patient reports history of mental health hospitalizations in the past, unsure of last date. Patient reports having signficant mental health symptoms around a month ago, when she was on the streets and had difficulty knowing where she  was and what was going on. Patient denies any current SI/HI/SIB.  Active interventions to stabilize mental health symptoms:  Patient reports taking medications, reported meeting with  this week.   Specific goals from treatment plan addressed this week:  Patient reported no changes to mental health symptoms, but did discuss frustrations in dealing with her family and wanting her own place. Patient also reported practicing acceptance with her anxiety.   Effectiveness of strategies:  Strategies appear effective.  Dimension #4 - Readiness for Change - Risk 1. Patient reports external motivation from probation, but also reports wanting to learn other skills to support her recovery.  Specific goals from treatment plan addressed this week:  Patient attended 3/3 groups this week. Patient reported the soonest she could get in to the doctor would be 10/18/18.    Effectiveness of strategies:  Strategies appear effective.   Dimension #5 - Relapse Potential - Risk 2. Patient reports last use in 04/2018, reports she has had periods of abstinence in the past. Patient identifies homelessness, anger and money have been triggers for use. Patient reports history of 1 previous treatment.   Specific goals from treatment plan addressed this week:  Patient reported no urges or use this past week.   Effectiveness of strategies:  Strategies appear effective.  Dimension #6 - Recovery Environment - Risk 3. Patient reports that she is currently in school at [employer] and staying with family members. Patient reports she working on getting into housing with her  through [facility]. Patient reports having supportive family and peer relationships and hopes to begin to attend NA meetings. Patient reports she is currently on probation through [facility].   Specific goals from treatment plan addressed this week:  Patient reported that she is looking into other housing options.   Effectiveness of strategies:  Strategies  appear effective.  T) Treatment plan updated: no  Patient notified and in agreement: N/A    Patient educated on Relapse Prevention. Patient has completed 24 of 90 program hours at this time. Projected discharge date is 12/4/18. Current discharge plan is TBD.     JONATHAN Dolan, [facility]  10/18/2018, 3:46 PM       Weekly Educational Topics Date   1. Dual Diagnoses, week 1    2. Dual Diagnoses, week 2    3. Stress Management    4. Feelings/Emotions    5. Thinking 9/10, 9/11   6. Change 9/17   7. Recovery Support    8. Relationships/Communication    9. Addiction 101 10/9   10. Relapse Prevention 10/15, 10/16, 10/17   11. Grief and Loss    12. Strengths    13. Wellness    Mindfulness

## 2021-07-20 NOTE — PATIENT INSTRUCTIONS - HE
PLAN:  We will discuss with your primary care provider documentation needed for more PCA hours.    We will schedule another appointment with Dr. Carrington to discuss spinal cord stimulator.    Discussed a trial of Namenda, help with nerve pain down your back and legs.  May do a titration pack with 7 mg daily for 1 week 14 mg daily for 1 week 21 mg daily for 1 week then 28 mg.    Follow-up with Dr. Alba in 6 to 8 weeks.

## 2021-07-20 NOTE — TELEPHONE ENCOUNTER
Who is calling:  Patient  Reason for Call:  Patient is asking for her refills to be sent today.  Date of last appointment with primary care: 3/22/19  Okay to leave a detailed message: Yes

## 2021-07-20 NOTE — PROGRESS NOTES
Discussed with Rani, , [phone] pt working on housing, has appt with Psychiatrist next week she is taking to.  Informed her pt missed appt with Dr. Carrington for spinal cord stimulator assessment, would not be candidate at this time.

## 2021-07-20 NOTE — PROGRESS NOTES
[facility]  I, Lorie OLAMIDE Vann, agree to following:    I will call by 1pm on the day of any absences. If I do not call by 1pm this absence is unexcused and may result in discharge from the program.     If I am out sick for 2+ days, I will provide a doctor s note to give to my counselor upon return to group.     I will arrive to group on time and inform counselor of any circumstances that may prevent me from getting to group on time.     I will complete all homework on time and maintain engagement and participation in group.     I will not use my cell phone in group and will excuse myself if I need to take a call or reply back to a text message.     I will not leave group until break time, unless it is absolutely necessary.     I will maintain abstinence throughout my participation. I understand that in I engage in substance use I may be recommended to a higher level of care.    I will schedule any and all appointments outside of group times. If there is an unavoidable conflict, I will discuss this with my counselor and provide documentation of appointments.      If I am unable to comply with these expectations, I understand that this behavior may result in a discharge from the [facility] OP program.      Patient Signature: _____________________________             Date: _____________          Staff Signature: JONATHAN Dolan, [facility]               Date: 10/8/2018, 12:03 PM

## 2021-07-20 NOTE — PROGRESS NOTES
"Addiction Services - Initial Services Plan     Patient  Name: Lorie Vann  MRN: [id number]   : 1970  Admit Date: 19       Patient describes their immediate need: To learn recovery skills to prevent relapse. Anger management skills.    Are there any immediate Safety Needs such as (physical, stability, mobility): Pt is able to get medical care as needed. Pt denies immediate concerns.     Immediate Health Needs and Plan:   None    Vulnerable Adult: No     Issues to be addressed in the first sessions:   Orientation to program.    Patient strengths and needs:   Strengths identified as \"God, family, grandkids\"  Needs identified as \"not stable with housing or mental state\"    Plan for patient for time between intake and completion of the treatment plan:   Attend all group therapy sessions as directed, complete all written and oral assignments as directed, and remain clean and sober. A relapse, if any, must be reported to staff immediately in order to ensure you are receiving the proper level of care. If you cannot attend a group therapy session you must call contact information provided in intake folder and leave a message before or during group hours.           Vulnerable Adult Review   [X] Review of the Facility Abuse Prevention plan was reviewed with the patient   [X] No Individual Abuse Plan is necessary   [ ] In addition to the Facility Abuse Prevention plan, an Individual Abuse Plan will be put in place       Staff Name/Title: Brandee Acuna   Date: 2019  Time: 1:20 PM        "

## 2021-07-20 NOTE — PROGRESS NOTES
Lorie Vann attended 3 hours of group therapy today. Counselor and patient met briefly after group to sign ZIGGY for [facility], discussed patient's housing options.     Total group size of 9.    2/15/2019 3:24 PM Augustina Leblanc

## 2021-07-20 NOTE — PROGRESS NOTES
ASSESSMENT/PLAN:  1. Vaginal itching  Wet Prep, Vaginal    Chlamydia trachomatis & Neisseria gonorrhoeae, Amplified Detection   2. Possible exposure to STD  HIV Antigen/Antibody Screening [place]   3. H/O syphilis  Syphilis Screen, [place]       This is a 46-year-old female, with history of yeast infections, here quite concerned about the possibility of another yeast infection.  Complains of vaginal discharge and itching.  She also notes that she has an underlying history of syphilis, and now is quite concerned that she may have STD exposure with a new partner.  She is vague in terms of her contents of her history taking, and review of her chart suggest that she has a history of bipolar disorder, as well as remote history of some chemical dependency issues.  Initially she wants to refill all of her medications here today, and I have declined given that I am not her primary physician, and I am not going to start doing her refills.  She is here today for an urgent care visit at a clinic other than her own primary clinic.  I would ask her to forward her refill request to her primary provider.  Today, speculum exam was performed and there is a fair amount of vaginal discharge present.  Wet prep, GC, chlamydia probe are sent today.  We will also draw HIV and syphilis screening.  We will treat as needed.  We will need to review her syphilis results cautiously on the basis of previous treatment history.  Of note her wet prep today is negative, and will review other labs as they are available.        There are no discontinued medications.  There are no Patient Instructions on file for this visit.    Chief Complaint:  Chief Complaint   Patient presents with     Possible yeast infection       HPI:   Lorie Vann is a 46 y.o. female c/o  C/o vaginal itching and discharge  Has h/o syphilis - treated a few years ago -   Worried about current partner/exposure    PMH:   Patient Active Problem List    Diagnosis Date Noted      Acute pain of right shoulder 06/06/2017     Hypertension with goal blood pressure less than 140/90 04/28/2017     Environmental and seasonal allergies 04/28/2017     Tobacco dependence 04/27/2017     Demyelinating disease 04/27/2017     Chronic depression 11/29/2014     Hyperlipidemia      Bipolar Disorder      Chronic Pain      Type 2 diabetes mellitus with diabetic polyneuropathy, without long-term current use of insulin      Arthritis      Lumbar radiculopathy      Past Medical History:   Diagnosis Date     Anemia      Cervical cancer      Chronic headaches      Cocaine abuse in remission      Depression      History of psychiatric disorder     requiring hospitalization     No past surgical history on file.  Social History     Social History     Marital status: Single     Spouse name: N/A     Number of children: 10     Years of education: N/A     Occupational History     Not on file.     Social History Main Topics     Smoking status: Current Some Day Smoker     Types: Cigarettes     Last attempt to quit: 9/21/2015     Smokeless tobacco: Never Used      Comment: 1 pack a week     Alcohol use No     Drug use: No     Sexual activity: Yes     Partners: Male     Other Topics Concern     Not on file     Social History Narrative    She walks, swims, and weight lifts 2x weekly. Volunteers for [employer], [employer], and [email or web address]. She has 3 male children between the ages of 10-31 and 7 female children between the ages of 16-30.        Meds:    Current Outpatient Prescriptions:      acetaminophen (TYLENOL) 325 MG tablet, Take 1 tablet (325 mg total) by mouth 4 (four) times a day., Disp: 120 tablet, Rfl: 0     ammonium lactate (LAC-HYDRIN) 12 % lotion, Apply topically., Disp: , Rfl:      aspirin 81 MG EC tablet, Take by mouth., Disp: , Rfl:      atorvastatin (LIPITOR) 20 MG tablet, Take 20 mg by mouth daily., Disp: , Rfl:      blood glucose test (ACCU-CHEK SMARTVIEW TEST STRIP) strips, Use 1 each As Directed 3 (three) times a  day. Dispense brand per patient's insurance at pharmacy discretion., Disp: 100 each, Rfl: 11     cetirizine (ZYRTEC) 10 MG tablet, Take 10 mg by mouth daily., Disp: , Rfl:      divalproex (DEPAKOTE) 250 MG EC tablet, Take 250 mg by mouth 3 (three) times a day., Disp: , Rfl:      dulaglutide 1.5 mg/0.5 mL PnIj, Inject 1.5 mg under the skin once a week., Disp: , Rfl:      fluticasone (FLONASE) 50 mcg/actuation nasal spray, 2 sprays daily., Disp: , Rfl:      generic lancets, 3 times daily. Test 3 times per day., Disp: , Rfl:      glipiZIDE (GLUCOTROL) 10 MG tablet, Take 10 mg by mouth daily., Disp: , Rfl:      ibuprofen (ADVIL,MOTRIN) 800 MG tablet, Take 800 mg by mouth 3 (three) times a day as needed., Disp: , Rfl:      lidocaine (XYLOCAINE) 5 % ointment, APPLY TOPICALLY TO AFFECTED AREA(S) TWICE DAILY AS NEEDED, Disp: 50 g, Rfl: 0     lisinopril (PRINIVIL,ZESTRIL) 20 MG tablet, Take 20 mg by mouth daily., Disp: , Rfl:      nicotine polacrilex (NICORETTE) 4 MG gum, Take 4 mg by mouth., Disp: , Rfl:      omeprazole (PRILOSEC) 20 MG capsule, Take 20 mg by mouth daily., Disp: , Rfl:      prochlorperazine (COMPAZINE) 10 MG tablet, TAKE 1 TABLET BY MOUTH EVERY 6 HOURS AS NEEDED FOR NAUSEA OR VOMITING, Disp: 20 tablet, Rfl: 0     QUEtiapine (SEROQUEL) 100 MG tablet, Take 100 mg by mouth 2 (two) times a day., Disp: , Rfl:      sitaGLIPtin (JANUVIA) 100 MG tablet, Take 100 mg by mouth daily., Disp: , Rfl:      traZODone (DESYREL) 100 MG tablet, Take 100 mg by mouth at bedtime., Disp: , Rfl:      fluconazole (DIFLUCAN) 150 MG tablet, TAKE 1 TABLET BY MOUTH TODAY AND REPEAT 2ND DOSE IN ONE WEEK IF SYMPTOMS DO NOT IMPROVE, Disp: 2 tablet, Rfl: 0     gabapentin (NEURONTIN) 300 MG capsule, TAKE 3 CAPSULES BY MOUTH THREE TIMES DAILY, Disp: 270 capsule, Rfl: 0     traMADol (ULTRAM) 50 mg tablet, TAKE 1 TABLET(50 MG) BY MOUTH DAILY AS NEEDED, Disp: 30 tablet, Rfl: 0    Allergies:  No Known Allergies    ROS:  Pertinent positives as  "noted in HPI; otherwise 12 point ROS negative.      Physical Exam:  EXAM:  /80 (Patient Site: Left Arm, Patient Position: Sitting, Cuff Size: Adult Large)  Pulse 76  Ht 5' 4\" (1.626 m)  Wt 204 lb 8 oz (92.8 kg)  BMI 35.1 kg/m2   Gen:  NAD, appears well, well-hydrated, anxious  HEENT:  TMs nl, oropharynx benign, nasal mucosa nl, conjunctiva clear  Neck:  Supple, no adenopathy, no thyromegaly, no carotid bruits, no JVD  Lungs:  Clear to auscultation bilaterally  Cor:  RRR no murmur  Abd:  Soft, nontender, BS+, no masses, no guarding or rebound, no HSM  PELVIC EXAM:External genitalia: normal  Vaginal mucosa normal  Vaginal discharge: white/yellow  Speculum exam shows a normal appearing cervix .   Bimanual exam: Cervix closed, firm, non tender  to motion.  Uterus  firm, regular, mobile, non tender to palpation. No adnexal masses or tenderness.   Extr:  Neg.  Neuro:  No asymmetry  Skin:  Warm/dry        Results:  Results for orders placed or performed in visit on 07/14/17   Wet Prep, Vaginal   Result Value Ref Range    Yeast Result No yeast seen No yeast seen    Trichomonas No Trichomonas seen No Trichomonas seen    Clue Cells, Wet Prep No Clue cells seen No Clue cells seen   Chlamydia trachomatis & Neisseria gonorrhoeae, Amplified Detection   Result Value Ref Range    Chlamydia trachomatis, Amplified Detection Negative Negative    Neisseria gonorrhoeae, Amplified Detection Negative Negative   HIV Antigen/Antibody Screening [place]   Result Value Ref Range    HIV Antigen / Antibody Negative Negative   Syphilis Screen, [facility]   Result Value Ref Range    Syphilis Screen [facility] Non-Reactive Non-Reactive             "

## 2021-07-20 NOTE — PROGRESS NOTES
Lorie Vann attended 2 hours of group therapy today.    Total group size of 10.    9/11/2018 12:10 PM Augustina Leblanc

## 2021-07-20 NOTE — PROGRESS NOTES
Patient is here for a follow up appointment with Dr. Alba. Patient has lower back pain, shooting to left buttock cheek and down to foot  describes the pain as constant, sharp, achy, unbearable, stabbing, throbbing, needles in her feet and rates the pain as 9/10. Patient wants to talk about pain management. Patient needs refills for gabapentin.  Functionality is 7.

## 2021-07-20 NOTE — PROGRESS NOTES
Lorie Vann attended 2 hours of group therapy today. Today was patient's first day of group. Patient introduced herself and was welcomed by group.     Total group size of 9.    9/10/2018 12:14 PM Augustina Leblanc

## 2021-07-20 NOTE — TELEPHONE ENCOUNTER
Telephone Encounter by Bree Leach at 4/25/2019 11:18 AM     Author: Bree Leach Service: -- Author Type: --    Filed: 4/25/2019 11:19 AM Encounter Date: 4/24/2019 Status: Signed    : Bree Leach APPROVED:    Approval start date:04/24/2019  Approval end date:04/24/2020    Pharmacy has been notified of approval and will contact patient when medication is ready for pickup.

## 2021-07-20 NOTE — PROGRESS NOTES
"LATE ENTRY FOR 9/4/18    Intake Note:     D) Lorie Vann is a 48 y.o.  single Black or  female who is referred to [facility] OP via [employer]/Probation with funding from [employer] [facility]. Patient orientated x 3. Patient meets criteria for Cocaine Use Disorder, Moderate.  Patient appears appropriate for [facility] at this time.   A) Met with patient for 60 minutes.  Completed intake assessment and preliminary paperwork, brief diagnostic assessment. Patient was given and explained counselor & supervisor license number and contact info, Patient Bill of Rights, program rules/regulations, Program Abuse Prevention Plan, confidentiality & HIPPA policies, grievance procedure, presented ROIs, TB & HIV/AIDS policies & resources, and Vulnerable Adult policy.   Conducted Vulnerable Adult Assessment.   R)No special Vulnerable Adult needed at this time.  Patient signed and agreed to counselor & supervisor license number and contact info., Patient Bill of Rights, group rules/regulations, Program Abuse Prevention Plan, confidentiality & HIPPA policies, grievance procedure,  ROIs, TB & HIV/AIDS policies & resources, and Vulnerable Adult policy. Patient scored lower risk on C-SSRS screen. Patient denied suicidal ideation/intent/plan/means at this time.     Opioid Use Disorder: No   Provided \"Options for Opioid Treatment in [place] and Overdose Prevention\" No     Dimension #1 - Withdrawal Potential - Risk 0. Patient reports last of crack in 4/2018, reports most recent pattern of use has been 1-2x per month, around $100 per time. Patient does not report or display any withdrawal concerns at time of intake.     Dimension #2 - Biomedical - Risk 1. Patient reports current health concerns of diabetes, neuropathy, carpal tunnel, bulging discs/degenerating disc, HBP. Patient reports having a primary care physician and taking medications as prescribed.     Dimension #3 - Emotional , Behavioral and Cognitive - Risk 2. Patient reports " history of mental health diagnosis of schizophrenia, bipolar and anxiety, and reports current mental health services through [facility]. Patient reports history of mental health hospitalizations in the past, unsure of last date. Patient reports having signficant mental health symptoms around a month ago, when she was on the streets and had difficulty knowing where she was and what was going on. Patient denies any current SI/HI/SIB.     Dimension #4 - Readiness for Change - Risk 1. Patient reports external motivation from probation, but also reports wanting to learn other skills to support her recovery.     Dimension #5 - Relapse Potential - Risk 2. Patient reports last use in 04/2018, reports she has had periods of abstinence in the past. Patient identifies homelessness, anger and money have been triggers for use. Patient reports history of 1 previous treatment.     Dimension #6 - Recovery Environment - Risk 3. Patient reports that she is currently in school at [employer] and staying with family members. Patient reports she working on getting into housing with her  through [facility]. Patient reports having supportive family and peer relationships and hopes to begin to attend NA meetings. Patient reports she is currently on probation through [facility].     T) Explained counselor & supervisor license number and contact info, Patient Bill of Rights, program rules/regulations, Program Abuse Prevention Plan, confidentiality & HIPPA policies, grievance procedure, presented ROIs, TB & HIV/AIDS policies & resources, and Vulnerable Adult policy. Patient expected to start group on 9/10/18.      JONATHAN Dolan, JUANJO  9/6/2018, 10:36 AM

## 2021-07-20 NOTE — TELEPHONE ENCOUNTER
Medication Question or Clarification  Who is calling: Patient  What medication are you calling about? (include dose and sig) traMADol (ULTRAM) 50 mg tablet X 2daily  Who prescribed the medication?: Dr. Biggs  What is your question/concern?: The pharmacy told her it was prescribed as 1 x daily.  Pharmacy: [facility] [place] and [address].  Okay to leave a detailed message?: Yes  Site CMT - Please call the pharmacy to obtain any additional needed information.

## 2021-07-20 NOTE — TELEPHONE ENCOUNTER
Reviewed patients chart and did not find any previous orders on these equipments. Relayed message to Denice, [employer] Care Coordinator for patient.     Denice is upset that this is taking longer than usual and requested if DOD can okay orders. I re-relayed message to her. She stated understanding but said she has requested these orders since Monday of this week. She called in and checked on the status with someone responding that Dr. Biggs is not in office and will review on Tuesday. Same incident happened on Tuesday when she called to check on the status.     She is insisting to hear back something by Wednesday of next week but wants to hear back sooner. Please advise.

## 2021-07-20 NOTE — PROGRESS NOTES
ZACHARY. Counselor met with patient for 10 minutes to discuss treatment attendance and expectation agreement. A. Counselor presented treatment attendance and expectations agreement, and discussed patient attendance going forward. R. Patient reported understanding agreement and that any further unexcused absences would result in discharge. Patient reported making changes to her schedule that will allow her to attend going forward. T. Patient to attend group on 10/9/18.       JONATHAN Dolan, [facility]  10/8/2018, 4:10 PM

## 2021-07-20 NOTE — TELEPHONE ENCOUNTER
Patient did not show for appointment last week.  Also did not show in August.  However she is apparently called to reschedule in February.  I have left her message to discuss her intentions.

## 2021-07-20 NOTE — PROGRESS NOTES
Subjective: This patient comes in for evaluation this 48-year-old female has had some epigastric and bilateral back pain.  She has been on some PPI in the past, omeprazole.    Please see below we did restart that    Complains of some acid/water brash symptoms.    Back seems more musculoskeletal can use Tylenol    She does have mental health concerns and continues to see therapist and psychiatrist.  She goes to [facility] 10/19/2019 [place].  She continues on trazodone Seroquel gabapentin and Depakote.    Regarding diabetes she did need to check on that last A1c was 6.8 this was rechecked today she does have my chart and will look at the results.    Regarding her lipids she is on atorvastatin LDL was 119 in May.  She continues on lisinopril 20 mg a day blood pressure looks good BMP was normal back in May as well.    Patient states her sugars are in 1 4160 range she is on Januvia as well as Trulicity 1.5 mg weekly    Tobacco status: She  reports that she has been smoking Cigarettes.  She has never used smokeless tobacco.    Patient Active Problem List    Diagnosis Date Noted     Abnormal Pap smear of cervix 04/23/2018     Irregular heartbeat 04/18/2018     Cocaine use 04/11/2018     DDD (degenerative disc disease), lumbosacral 04/11/2018     Hypertension with goal blood pressure less than 140/90 04/28/2017     Environmental and seasonal allergies 04/28/2017     Tobacco dependence 04/27/2017     Peripheral neuropathy 04/27/2017     Schizoaffective disorder, bipolar type (H) 10/06/2015     Chronic depression 11/29/2014     ANGELINE III (cervical intraepithelial neoplasia grade III) with severe dysplasia 08/02/2014     Syphilis 07/09/2014     Mixed hyperlipidemia      Chronic Pain      Lumbar radiculopathy      Microalbuminuria 01/27/2014     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 120 tablet 5     aspirin 81 MG EC tablet Take 1 tablet (81 mg total) by mouth daily. 30 tablet 11     atorvastatin (LIPITOR) 20 MG tablet Take 1 tablet (20 mg total) by mouth daily. 30 tablet 3     blood glucose meter (GLUCOMETER) Use 1 each As Directed as needed. Dispense glucometer brand per patient's insurance at pharmacy discretion. 1 each 0     blood glucose test (ACCU-CHEK SMARTVIEW TEST STRIP) strips Use 1 each As Directed 3 (three) times a day. Dispense brand per patient's insurance at pharmacy discretion. 100 each 11     cetirizine (ZYRTEC) 10 MG tablet Take 1 tablet (10 mg total) by mouth daily. 30 tablet 5     divalproex (DEPAKOTE DR) 250 MG 12 hour tablet Take 1 tablet (250 mg total) by mouth 3 (three) times a day. 180 tablet 5     dulaglutide (TRULICITY) 1.5 mg/0.5 mL PnIj Inject 1.5 mg under the skin once a week. 4 Syringe 3     dulaglutide 1.5 mg/0.5 mL PnIj Inject 1.5 mg under the skin once a week.       fluticasone (FLONASE) 50 mcg/actuation nasal spray 2 sprays into each nostril daily. 16 g 5     gabapentin (NEURONTIN) 300 MG capsule Take 900 mg by mouth.       generic lancets 3 times daily. Test 3 times per day.       ibuprofen (ADVIL,MOTRIN) 800 MG tablet Take 1 tablet (800 mg total) by mouth every 8 (eight) hours as needed for pain or fever. Take with food. 60 tablet 0     lisinopril (PRINIVIL,ZESTRIL) 20 MG tablet Take 1 tablet (20 mg total) by mouth daily. 30 tablet 3     omeprazole (PRILOSEC) 20 MG capsule Take 1 capsule (20 mg total) by mouth daily. 30 capsule 3     QUEtiapine (SEROQUEL) 100 MG tablet Take 1 tablet (100 mg total) by mouth 2 (two) times a day. 60 tablet 5     sitaGLIPtin (JANUVIA) 100 MG tablet Take 1 tablet (100 mg total) by mouth daily. 30 tablet 3     terbinafine HCl (LAMISIL) 1 % cream  Apply topically to affected areas BID 45 g 0     traMADol (ULTRAM) 50 mg tablet TAKE 1 TABLET(50 MG) BY MOUTH DAILY AS NEEDED 30 tablet 0     traZODone (DESYREL) 100 MG tablet Take 100 mg by mouth.       traZODone (DESYREL) 100 MG tablet Take 1 tablet (100 mg total) by mouth at bedtime. 90 tablet 1     No current facility-administered medications for this visit.        ROS: 10 point review of systems negative other than as outlined above    Objective:    /86  Pulse 84  Resp 20  Wt 195 lb (88.5 kg)  BMI 32.2 kg/m2  Body mass index is 32.2 kg/(m^2).      General appearance no acute distress    HEENT neck was supple oropharynx was clear    Eyes react normally pupils.    Abdomen soft nontender no guarding or rebound she complains of some epigastric symptoms at times and some reflux back with some paraspinal tenderness mild negative straight leg raising    Extremities without edema.    Joints without redness warmth or swelling    Lungs are clear no rales or rhonchi heart regular rate in the 80s.    A1c is down to 6.4    Results for orders placed or performed in visit on 10/25/18   Glycosylated Hemoglobin A1c   Result Value Ref Range    Hemoglobin A1c 6.4 (H) 3.5 - 6.0 %       Assessment:  1. 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 Mixed hyperlipidemia     3. Hypertension with goal blood pressure less than 140/90     4. Schizoaffective disorder, bipolar type (H)     5. Gastritis  omeprazole (PRILOSEC) 20 MG capsule     Diabetes mellitus stable continue same medications.    Mixed hyperlipidemia continue atorvastatin    Hypertension controlled on lisinopril    Schizoaffective disorder bipolar go to ongoing treatment with psychiatry and therapy    Gastritis symptoms omeprazole 20 mg a day    Regarding any back pain can do some stretching and use Tylenol    Plan: As outlined  above recheck in 3 months with primary physician    This transcription uses voice recognition software, which may contain typographical errors.

## 2021-07-20 NOTE — PROGRESS NOTES
Lorie Vann attended 3 hours of group therapy today.    Total group size of 11.    10/24/2018 12:07 PM Augustina Leblanc  
mid chest pain

## 2021-07-20 NOTE — TELEPHONE ENCOUNTER
Telephone Encounter by Xi Duarte at 4/23/2019  8:45 AM     Author: Xi Duarte Service: -- Author Type: --    Filed: 4/23/2019  8:48 AM Encounter Date: 4/19/2019 Status: Signed    : Xi Duarte       PRIOR AUTHORIZATION DENIED    Denial Rational: PATIENT NEEDS TO TRY/FAIL METFORMIN AND GLYBURIDE.        Appeal Information:

## 2021-07-20 NOTE — TELEPHONE ENCOUNTER
Can we look into see if these are new orders or if she has had these previously.  if has had these previously okay for orders.  Otherwise will have PCP review.

## 2021-07-20 NOTE — PROGRESS NOTES
Spoke with daughter and patient is not home right now. Daughter will have patient call us back. No information given to daughter.

## 2021-07-20 NOTE — PROGRESS NOTES
"Assessment/Plan:     Problem List Items Addressed This Visit     None            No follow-ups on file.    There are no Patient Instructions on file for this visit.      Subjective:       49 y.o. Lorie Vann The patient has been notified of the following:      \"We have found that certain health care needs can be provided without the need for a face to face visit.  This service lets us provide the care you need with a phone conversation.       I will have full access to your [facility] medical record during this entire phone call.   I will be taking notes for your medical record.      Since this is like an office visit, we will bill your insurance company for this service.       There are potential benefits and risks of telephone visits (e.g. limits to patient confidentiality) that differ from in-person visits.?  Confidentiality still applies for telephone services, and nobody will record the visit.  It is important to be in a quiet, private space that is free of distractions (including cell phone or other devices) during the visit.??      If during the course of the call I believe a telephone visit is not appropriate, you will not be charged for this service\"     Consent has been obtained for this service by care team member: Yes           Reviewing the record the patient was a no-show on 3/4 with Dr. Carrington to discuss a spinal cord stimulator.  This would raise a concern whether she is appropriate candidate.    On interview she describes she has been a lot of pain.  She is aware that she missed the appointment for spinal cord stimulator would like to look into this.  Continues with pain in her back down her legs.    Review she had stopped the Suboxone through [place].  She was told that that was for pain.  She did not think was particular helpful made her drowsy.    Since last seen a trial lamotrigine was increased to 75 mg, she did not think it helped and did not have side effects.    She had been to school " for her [employer] program until the coronavirus.    She has established with a new psychiatrist Dr. De Souza at [facility].  There have been no changes made to medications.  She continues on her amitriptyline and Depakote.  He has not been assigned a psychotherapist.    She asks if I can write a letter for her to get back her PCA hours.  Describes she had a PCA hope to do laundry and other activities.  She describes because of her carpal tunnel symptoms she had difficulties.  She wears braces, has not had surgery.  She did have surgery on her rotator cuff in 2018.  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  She notes she likes going to school and learning.  The letters were apparently done by her previous primary care provider who has left.  She has now established with a different provider through [facility].      Current Outpatient Medications:      atorvastatin (LIPITOR) 20 MG tablet, Take 1 tablet (20 mg total) by mouth daily., Disp: 30 tablet, Rfl: 3     blood glucose meter (GLUCOMETER), Use 1 each As Directed as needed. Dispense glucometer brand per patient's insurance at pharmacy discretion., Disp: 1 each, Rfl: 0     cetirizine (ZYRTEC) 10 MG tablet, Take 1 tablet (10 mg total) by mouth daily., Disp: 30 tablet, Rfl: 5     dulaglutide (TRULICITY) 1.5 mg/0.5 mL PnIj, Inject 1.5 mg under the skin once a week., Disp: 4 Syringe, Rfl: 3     escitalopram oxalate (LEXAPRO) 10 MG tablet, Take 1 tablet (10 mg total) by mouth daily., Disp: 30 tablet, Rfl: 0     fluticasone propionate (FLONASE) 50 mcg/actuation nasal spray, 2 sprays into each nostril daily., Disp: 16 g, Rfl: 0     generic lancets, 3 times daily. Test 3 times per day., Disp: , Rfl:      lisinopril (PRINIVIL,ZESTRIL) 20 MG tablet, Take 1 tablet (20 mg total) by mouth daily., Disp: 30 tablet, Rfl: 3     omeprazole (PRILOSEC) 20 MG capsule, Take 1 capsule (20 mg total) by mouth daily., Disp: 30 capsule, Rfl: 3      "sitaGLIPtin (JANUVIA) 100 MG tablet, Take 1 tablet (100 mg total) by mouth daily., Disp: 30 tablet, Rfl: 3     traZODone (DESYREL) 100 MG tablet, Take 1 tablet (100 mg total) by mouth at bedtime., Disp: 30 tablet, Rfl: 3     aspirin 81 MG EC tablet, Take 1 tablet (81 mg total) by mouth daily., Disp: 30 tablet, Rfl: 11     cholecalciferol, vitamin D3, 1,000 unit (25 mcg) tablet, One capsule daily for one week, two daily one week, three daily one week, then four daily, Disp: 100 tablet, Rfl: 1     lamoTRIgine (LAMICTAL) 25 MG tablet, One daily for two weeks, one twice a day two weeks, then two morning and one bedtime, Disp: 60 tablet, Rfl: 3     memantine 7-14-21-28 mg C24k, Take 1 tablet by mouth daily., Disp: 28 each, Rfl: 0  By phone sounds alert, clear sensorium.  Thought process logical.    Impression: Chronic pain relates to history of lumbar radiculopathy and neuropathy.  She continues with similar symptoms.    Has comorbid mood disorder reports stable psychiatrically.    Plan: I recommended she have her new primary care provider write such a letter for her PCA as I am not familiar with the situation and the likely of documentation of the other provider.    I will schedule again for another evaluation for the spinal cord stimulator assessment with Dr. Carrington.  Gauged with mental health treatment and may be relatively more stable.    We discussed off label use of Namenda as a different approach to help with her neuropathic pain.  Usual side effects discussed will begin a titrating schedule.    Reviewed if she thought the Suboxone had been particularly helpful I might consider looking into continuing to prescribe it after consultation with her primary care, but she did not find it was of significant benefit.    Telephone time more than 15 minutes               Objective:     Vitals:    04/29/20 1540   Height: 5' 4\" (1.626 m)   PainSc: 10-Worst pain ever   PainLoc: Back             This note has been dictated " using voice recognition software. Any grammatical or context distortions are unintentional and inherent to the software

## 2021-07-20 NOTE — PROGRESS NOTES
Lorie Vann attended 2 hours of group therapy today.    Total group size of 14.    10/29/2018 12:03 PM Augustina Leblanc

## 2021-07-20 NOTE — PROGRESS NOTES
"Clinic Care Coordination Contact  [facility]/Voicemail    Reason: [id number] - Ambulatory referral to Care Management (Primary Care).   NOTE: please see the \"Note\" or comments section attached with the referral for more info if need.     Clinical Data: Care Coordinator Outreach  Outreach attempted x 3.  Left message on patient's voicemail with call back information and requested return call.  Plan: Care Coordinator will send care coordination introduction letter with care coordinator contact information and explanation of care coordination services via mail. Care Coordinator will do no further outreaches at this time.        "

## 2021-07-20 NOTE — PROGRESS NOTES
ASSESSMENT:  1. Chronic right shoulder pain  Lorie Vann is a 46-year-old woman who presents for chronic right shoulder pain.  This was diagnosed as rotator cuff tendinitis, been going on for several months, she has been going to physical therapy for several weeks without improvement.  Pain limits the ability to passive or actively flex or abduct the right shoulder.  There is no trauma, and we note x-ray of the shoulder is normal.  Pain does travel from the neck down to the shoulder down to the elbow.  I still think this is primarily shoulder pain, and not cervical radiculopathy, but would recommend referral to orthopedics given the lack of progress.  - Ambulatory referral to Orthopedics    2. Chronic pain syndrome  History of chronic pain, not the focus of this visit today.  -Noted she is on tramadol  - acetaminophen (TYLENOL) 325 MG tablet; Take 1 tablet (325 mg total) by mouth 4 (four) times a day.  Dispense: 120 tablet; Refill: 0    3. Type 2 diabetes mellitus with diabetic polyneuropathy, without long-term current use of insulin  Diabetes traditionally has not been well controlled.  She is satisfied with the Trulicity, Januvia, glipizide combination.  She has been on insulin in the past.  We provided her a new Accu-Chek Bibiana meter with test strips, since her other one was destroyed.  I recommended follow-up in 2 weeks with blood sugars fasting and pre-meal  --Last A1c 8.0 in February  --Starr had difficulty tolerating Trulicity, now seems to be going well  --Taking gabapentin for peripheral neuropathy    4. Tobacco dependence  She intermittently smokes, intermittently uses the nicotine replacement, but not using the patch.    5. Demyelinating disease  She has an odd history of optic neuritis without clear cause.  She has some difficult to explain symptoms, will look into whether or not there was any evidence of multiple sclerosis in her past.  --Notes from 2012 and 2011 not available on care  everywhere    6.  Healthcare maintenance  Pap was recently completed in March 2017    PLAN:  Patient Instructions   Follow-up in about 2 weeks    Test blood sugar in morning plus before one the meals     Referral to [facility]           Orders Placed This Encounter   Procedures     Ambulatory referral to Orthopedics     Referral Priority:   Routine     Referral Type:   Consultation     Referral Reason:   Evaluation and Treatment     Referral Location:   [facility]     Requested Specialty:   Orthopedics     Number of Visits Requested:   1     Medications Discontinued During This Encounter   Medication Reason     traZODone (DESYREL) 100 MG tablet Duplicate order     QUEtiapine (SEROQUEL) 100 MG tablet Duplicate order     lisinopril (PRINIVIL,ZESTRIL) 5 MG tablet Therapy completed     divalproex (DEPAKOTE) 250 MG EC tablet Duplicate order     insulin glargine (LANTUS) 100 unit/mL injection      gabapentin (NEURONTIN) 400 MG capsule      insulin lispro (HUMALOG) 100 unit/mL Crtg      nicotine (NICODERM CQ) 7 mg/24 hr      prochlorperazine (COMPAZINE) 10 MG tablet      tablet cutter Misc      acetaminophen (TYLENOL) 325 MG tablet Reorder     blood glucose meter (GLUCOMETER)      blood glucose test strips        Return in about 2 weeks (around 5/11/2017).    CHIEF COMPLAINT:  Chief Complaint   Patient presents with     Establish Care     Diabetes     Hypertension       HISTORY OF PRESENT ILLNESS:  Lorie is a 46 y.o. female presenting to the clinic today to establish care. She has seen multiple doctors in the past few weeks trying to find the right one for her to establish care with. She was previously seen at [facility] in [place].    Diabetes: She endorses neuropathy in both her hands and feet. The neuropathy has caused her to lose  strength in her hands. She was seen for a diabetic eye exam on 4/13/17. Her last A1c on 2/27/17 was 8. Since beginning the Januvia and Trulicity her diabetes  has been much more controlled. She endorses historical A1c readings as high as 14.1. She used to be a big junk food eater but recently changed her diet and is eating far more healthy. Her mother  from diabetes and her uncle had his legs amputated. She has found Humalog and lantus ineffective. She is currently treating her neuropathy with 900mg of gabapentin 3x daily. She needs a blood sugar meter today. Her previous blood sugar meter was an Acu-Chek erik.    Hypertension: Currently treating with 20mg of lisinopril daily.     Back Pain: She has low back pain that shoots down her left buttocks and into her left leg. The pain at times is severe enough where she is inhibited from walking. At one point she could not walk for about 3 weeks. She is currently enrolled in PT.     Right Shoulder Pain: The pain extends down her right arm to her elbow and up through her neck. She is unable to use her right arm due to lack of strength. Upon presentation today she had to shake hands using her left hand. She endorses numbness and tingling in her shoulder. She is unable to raise her arm above her head and cannot  anything due to her diabetic neuropathy. The pain appeared suddenly one morning and was not due to trauma or injury. She was seen at a hospital 2 months after the pain appeared and was told the pain was due to her rotator cuff. She has been enrolled in PT the last 3 weeks but has not noticed improvement.    Bipolar: She does not currently have a mental health provider. She was previously seen by [facility]. She is currently treating with 250mg Depakote 3x daily, 100mg Seroquel 2x daily, and 100mg trazodone daily at bedtime.     Allergies: Treating with Zyrtec and Flonase daily.    GERD: Treated with 20mg of omeprazole daily.     Health Maintenance: She is up to date with her mammogram and PAP smear. She recently received a tetanus booster.     REVIEW OF SYSTEMS:   Even with all of her issues she is a  happy person and tries to keep a good mood. When she is stressed out she smokes. Other days she chews nicotine gum. She has a history of chronic headaches, but these has resolved. She notes that one of her headaches caused one of her eyes to go blind for 2 months. She takes tylenol as needed. She endorses bilateral shin pain of unknown origin. She feels she is having memory issues and would like to see a psychiatrist. All other systems are negative.    PFSH:   Her fiance  last year. He was only 30 years old. She is very involved in her community and volunteers with [employer]. Reviewed as below.  History   Smoking Status     Current Some Day Smoker     Types: Cigarettes     Last attempt to quit: 2015   Smokeless Tobacco     Not on file     Comment: 1 pack a week       No family history on file.    Social History     Social History     Marital status: Single     Spouse name: N/A     Number of children: N/A     Years of education: N/A     Occupational History     Not on file.     Social History Main Topics     Smoking status: Current Some Day Smoker     Types: Cigarettes     Last attempt to quit: 2015     Smokeless tobacco: Not on file      Comment: 1 pack a week     Alcohol use Not on file     Drug use: Not on file     Sexual activity: Not on file     Other Topics Concern     Not on file     Social History Narrative       No past surgical history on file.    No Known Allergies    Active Ambulatory Problems     Diagnosis Date Noted     Hyperlipidemia      Bipolar Disorder      Chronic Pain      Type 2 diabetes mellitus with diabetic polyneuropathy, without long-term current use of insulin      Arthritis      Lumbar radiculopathy      Chronic depression 2014     Lumbar radicular pain 2014     Tobacco dependence 2017     Demyelinating disease 2017     Resolved Ambulatory Problems     Diagnosis Date Noted     No Resolved Ambulatory Problems     No Additional Past Medical History  "      VITALS:  Vitals:    04/27/17 0925   BP: 136/84   Pulse: 82   SpO2: 98%   Weight: (!) 224 lb 4.8 oz (101.7 kg)   Height: 5' 4\" (1.626 m)     Wt Readings from Last 3 Encounters:   04/27/17 (!) 224 lb 4.8 oz (101.7 kg)   10/05/15 (!) 233 lb (105.7 kg)   08/11/15 (!) 230 lb (104.3 kg)     Body mass index is 38.5 kg/(m^2).    PHYSICAL EXAM:    General: Alert, no distress  HEENT: Head normocephalic, sclera anicteric, oral mucosa moist  Heart: Regular rate and rhythm.  Lungs: Clear to auscultation.   Neck/Shoulder: Unable to passively flex or abduct right shoulder. Diffuse tenderness in upper trapezius. Spurling test negative.   Neurologic: No gross abnormalities  Psychiatric: Pleasant affect, normal mood    ADDITIONAL HISTORY SUMMARIZED (2): Reviewed diabetic eye exam from 4/13/17,   DECISION TO OBTAIN EXTRA INFORMATION (1): Accessed Care Everywhere.   RADIOLOGY TESTS (1): Reviewed MRI from 9/2/12 regarding optic neuritis. Reviewed X-ray of right shoulder from 2/8/17.  LABS (1): Reviewed labs from Care Everywhere including most recent A1c  MEDICINE TESTS (1): None.  INDEPENDENT REVIEW (2 each): None.     The visit lasted a total of 37 minutes face to face with the patient. Over 50% of the time was spent counseling and educating the patient about numerous health issues.    IBhanu, am scribing for and in the presence of, Dr. Chilel.    I, Dr. Chilel, personally performed the services described in this documentation, as scribed by Bhanu Kinney in my presence, and it is both accurate and complete.    MEDICATIONS:  Current Outpatient Prescriptions   Medication Sig Dispense Refill     ammonium lactate (LAC-HYDRIN) 12 % lotion Apply topically.       aspirin 81 MG EC tablet Take by mouth.       atorvastatin (LIPITOR) 20 MG tablet Take 20 mg by mouth daily.       cetirizine (ZYRTEC) 10 MG tablet Take 10 mg by mouth daily.       divalproex (DEPAKOTE) 250 MG EC tablet Take 250 mg by mouth 3 (three) times a day.       " dulaglutide 1.5 mg/0.5 mL PnIj Inject 1.5 mg under the skin once a week.       fluticasone (FLONASE) 50 mcg/actuation nasal spray 2 sprays daily.       gabapentin (NEURONTIN) 300 MG capsule Take 900 mg by mouth 3 (three) times a day.       generic lancets 3 times daily. Test 3 times per day.       glipiZIDE (GLUCOTROL) 10 MG tablet Take 10 mg by mouth daily.       ibuprofen (ADVIL,MOTRIN) 800 MG tablet Take 800 mg by mouth 3 (three) times a day as needed.       lidocaine (XYLOCAINE) 5 % ointment Apply topically.       lisinopril (PRINIVIL,ZESTRIL) 20 MG tablet Take 20 mg by mouth daily.       nicotine polacrilex (NICORETTE) 4 MG gum Take 4 mg by mouth.       omeprazole (PRILOSEC) 20 MG capsule Take 20 mg by mouth daily.       QUEtiapine (SEROQUEL) 100 MG tablet Take 100 mg by mouth 2 (two) times a day.       sitaGLIPtin (JANUVIA) 100 MG tablet Take 100 mg by mouth daily.       traMADol (ULTRAM) 50 mg tablet Take 50 mg by mouth daily as needed.       traZODone (DESYREL) 100 MG tablet Take 100 mg by mouth at bedtime.       acetaminophen (TYLENOL) 325 MG tablet Take 1 tablet (325 mg total) by mouth 4 (four) times a day. 120 tablet 0     blood glucose test (ACCU-CHEK SMARTVIEW TEST STRIP) strips Use 1 each As Directed 3 (three) times a day. Dispense brand per patient's insurance at pharmacy discretion. 100 each 11     No current facility-administered medications for this visit.        Total data points:5

## 2021-07-20 NOTE — TELEPHONE ENCOUNTER
RN cannot approve Refill Request    RN can NOT refill this medication med is not covered by policy/route to provider and Protocol failed and NO refill given.       Rose Morales, [facility] Triage/Med Refill 4/11/2019    Requested Prescriptions   Pending Prescriptions Disp Refills     dulaglutide (TRULICITY) 1.5 mg/0.5 mL PnIj 4 Syringe 3     Sig: Inject 1.5 mg under the skin once a week.       Insulin/GLP-1 Refill Protocol Failed - 4/10/2019  4:23 PM        Failed - Microalbumin in last year     No results found for: MICROALBUR               Passed - Visit with PCP or prescribing provider visit in last 6 months     Last office visit with prescriber/PCP: Visit date not found OR same dept: 10/25/2018 Prabhjot Mckeon MD OR same specialty: 10/25/2018 Prabhjot Mckeon MD Last physical: 3/22/2019 Last MTM visit: Visit date not found     Next appt within 3 mo: Visit date not found  Next physical within 3 mo: Visit date not found  Prescriber OR PCP: India Forde MD  Last diagnosis associated with med order: 1. Type 2 diabetes mellitus with neurological manifestations (H)  - dulaglutide (TRULICITY) 1.5 mg/0.5 mL PnIj; Inject 1.5 mg under the skin once a week.  Dispense: 4 Syringe; Refill: 3  - sitaGLIPtin (JANUVIA) 100 MG tablet; Take 1 tablet (100 mg total) by mouth daily.  Dispense: 30 tablet; Refill: 3  - aspirin 81 MG EC tablet; Take 1 tablet (81 mg total) by mouth daily.  Dispense: 30 tablet; Refill: 11    2. Gastritis    3. Tinea pedis of both feet  - terbinafine HCl (LAMISIL) 1 % cream; Apply topically to affected areas BID  Dispense: 45 g; Refill: 0    4. Chronic pain syndrome  - acetaminophen (TYLENOL) 325 MG tablet; Take 1 tablet (325 mg total) by mouth 4 (four) times a day.  Dispense: 120 tablet; Refill: 5    5. Hypertension with goal blood pressure less than 140/90  - aspirin 81 MG EC tablet; Take 1 tablet (81 mg total) by mouth daily.  Dispense: 30 tablet; Refill: 11    If protocol  passes may refill for 6 months if within 3 months of last provider visit (or a total of 9 months).              Passed - A1C in last 6 months     Hemoglobin A1c   Date Value Ref Range Status   10/25/2018 6.4 (H) 3.5 - 6.0 % Final               Passed - Blood pressure in last year     BP Readings from Last 1 Encounters:   03/22/19 90/58             Passed - Creatinine done in last year     Creatinine   Date Value Ref Range Status   05/29/2018 0.80 0.60 - 1.10 mg/dL Final             sitaGLIPtin (JANUVIA) 100 MG tablet 30 tablet 3     Sig: Take 1 tablet (100 mg total) by mouth daily.       Oral Hypoglycemics Refill Protocol Failed - 4/10/2019  4:23 PM        Failed - Microalbumin in last year      No results found for: MICROALBUR               Passed - Visit with PCP or prescribing provider visit in last 6 months       Last office visit with prescriber/PCP: Visit date not found OR same dept: 10/25/2018 Prabhjot Mckeon MD OR same specialty: 10/25/2018 Prabhjot Mckeon MD Last physical: 3/22/2019 Last MTM visit: Visit date not found         Next appt within 3 mo: Visit date not found  Next physical within 3 mo: Visit date not found  Prescriber OR PCP: India Forde MD  Last diagnosis associated with med order: 1. Type 2 diabetes mellitus with neurological manifestations (H)  - dulaglutide (TRULICITY) 1.5 mg/0.5 mL PnIj; Inject 1.5 mg under the skin once a week.  Dispense: 4 Syringe; Refill: 3  - sitaGLIPtin (JANUVIA) 100 MG tablet; Take 1 tablet (100 mg total) by mouth daily.  Dispense: 30 tablet; Refill: 3  - aspirin 81 MG EC tablet; Take 1 tablet (81 mg total) by mouth daily.  Dispense: 30 tablet; Refill: 11    2. Gastritis    3. Tinea pedis of both feet  - terbinafine HCl (LAMISIL) 1 % cream; Apply topically to affected areas BID  Dispense: 45 g; Refill: 0    4. Chronic pain syndrome  - acetaminophen (TYLENOL) 325 MG tablet; Take 1 tablet (325 mg total) by mouth 4 (four) times a day.  Dispense: 120  tablet; Refill: 5    5. Hypertension with goal blood pressure less than 140/90  - aspirin 81 MG EC tablet; Take 1 tablet (81 mg total) by mouth daily.  Dispense: 30 tablet; Refill: 11     If protocol passes may refill for 12 months if within 3 months of last provider visit (or a total of 15 months).           Passed - A1C in last 6 months     Hemoglobin A1c   Date Value Ref Range Status   10/25/2018 6.4 (H) 3.5 - 6.0 % Final               Passed - Blood pressure in last year     BP Readings from Last 1 Encounters:   03/22/19 90/58             Passed - Serum creatinine in last year     Creatinine   Date Value Ref Range Status   05/29/2018 0.80 0.60 - 1.10 mg/dL Final             terbinafine HCl (LAMISIL) 1 % cream 45 g 0     Sig: Apply topically to affected areas BID       There is no refill protocol information for this order        acetaminophen (TYLENOL) 325 MG tablet 120 tablet 5     Sig: Take 1 tablet (325 mg total) by mouth 4 (four) times a day.       There is no refill protocol information for this order        aspirin 81 MG EC tablet 30 tablet 11     Sig: Take 1 tablet (81 mg total) by mouth daily.       Aspirin/Dipyridamole Refill Protocol Passed - 4/10/2019  4:23 PM        Passed - PCP or prescribing provider visit in past 12 months       Last office visit with prescriber/PCP: 5/29/2018 India Forde MD OR same dept: 10/25/2018 Prabhjot Mckeon MD OR same specialty: 10/25/2018 Prabhjot Mckeon MD  Last physical: 3/22/2019 Last MTM visit: Visit date not found    Next appt within 3 mo: Visit date not found Next physical within 3 mo: Visit date not found  Prescriber OR PCP: India Forde MD  Last diagnosis associated with med order: 1. Type 2 diabetes mellitus with neurological manifestations (H)  - dulaglutide (TRULICITY) 1.5 mg/0.5 mL PnIj; Inject 1.5 mg under the skin once a week.  Dispense: 4 Syringe; Refill: 3  - sitaGLIPtin (JANUVIA) 100 MG tablet; Take 1 tablet (100 mg  total) by mouth daily.  Dispense: 30 tablet; Refill: 3  - aspirin 81 MG EC tablet; Take 1 tablet (81 mg total) by mouth daily.  Dispense: 30 tablet; Refill: 11    2. Gastritis    3. Tinea pedis of both feet  - terbinafine HCl (LAMISIL) 1 % cream; Apply topically to affected areas BID  Dispense: 45 g; Refill: 0    4. Chronic pain syndrome  - acetaminophen (TYLENOL) 325 MG tablet; Take 1 tablet (325 mg total) by mouth 4 (four) times a day.  Dispense: 120 tablet; Refill: 5    5. Hypertension with goal blood pressure less than 140/90  - aspirin 81 MG EC tablet; Take 1 tablet (81 mg total) by mouth daily.  Dispense: 30 tablet; Refill: 11    If protocol passes may refill for 6 months if within 3 months of last provider visit (or a total of 9 months).          ibuprofen (ADVIL,MOTRIN) 800 MG tablet 60 tablet 0     Sig: Take 1 tablet (800 mg total) by mouth every 8 (eight) hours as needed for pain or fever. Take with food.       There is no refill protocol information for this order

## 2021-07-20 NOTE — PROGRESS NOTES
Addiction Services - Initial Services Plan     Patient  Name: Lorie Vann  MRN: [id number]   : 1970  Admit Date: 18      Patient describes their immediate need: To learn recovery skills to prevent relapse. Patient identifies needing to get into housing for herself and 11 year old child.     Are there any immediate Safety Needs such as (physical, stability, mobility):  Pt is able to get medical care as needed. Pt denies immediate concerns.     Immediate Health Needs and Plan:   Patient reports doctors appointments this week.     Vulnerable Adult: No     Issues to be addressed in the first sessions:   Wanting to get adjusted to group.     Patient strengths and needs:   Strengths identified as God, family, friends, myself--choosing to not get high. Determined, motivated, outgoing.   Needs identified as housing, more coping tools.     Plan for patient for time between intake and completion of the treatment plan:   Attend all group therapy sessions as directed, complete all written and oral assignments as directed, and remain clean and sober. A relapse, if any, must be reported to staff immediately in order to ensure you are receiving the proper level of care. If you cannot attend a group therapy session you must call contact information provided in intake folder and leave a message before or during group hours.     Patient to start group on 18.       Vulnerable Adult Review   [X] Review of the Facility Abuse Prevention plan was reviewed with the patient   [X] No Individual Abuse Plan is necessary   [ ] In addition to the Facility Abuse Prevention plan, an Individual Abuse Plan will be put in place       Staff Name/Title: JONATHAN Dolan, [facility] [place]   Date: 2018  Time: 4:32 PM

## 2021-07-20 NOTE — TELEPHONE ENCOUNTER
Orders being requested: Shower chair  Reason service is needed/diagnosis:  Chronic pain, Lumbar radiculopathy  When are orders needed by:  Today   Where to send Orders: [facility]   Okay to leave detailed message?  Yes    Orders being requested: Bilateral hand braces  Reason service is needed/diagnosis:  Carpal Tunnel  When are orders needed by: Today  Where to send Orders: [facility]   Okay to leave detailed message?  Yes        Orders being requested: Compression  stockings  Reason service is needed/diagnosis: Peripheral neuropathy  When are orders needed by: RAVINDER  Where to send Orders: [facility]   Okay to leave detailed message?  Yes

## 2021-07-20 NOTE — PROGRESS NOTES
"Rule 31 by Brandee Carpenter at 2019  1:30 PM     Author: Brandee Carpenter Service: -- Author Type: Licensed Alcohol and Drug Counselor    Filed: 2019  2:37 PM Encounter Date: 2019 Status: Signed    : Brandee Carpenter (Licensed Alcohol and Drug Counselor)         [facility] Assessment   Date: 2019        : Brandee Acuna    Name: Lorie Vann  Address: [address] [place] [address]  Phone: [phone] (home)   Referral Source: Deny May  : 1970  Age: 48 y.o.  Race/Ethnicity: Black or   Marital Status: Single  Employment: Unemployed-Disability                                                                                                                       Level of Education: Associates degree   Socio-economic (yearly Income) Status: Low income  Sexual Orientation: Heterosexual    Last 4 digits of Social Security: 6490    Is assistance required in the ability to read and understand written material?   no    Reason for seeking services:    \"Trying to maintain and stay clean and sober. Just to be stable especially with my mental illness.\"    Dimension I Acute intoxication/Withdrawal Potential:    Symptomology (past 12 months, check all that apply)  decreased tolerance, hurried ingestion, medicinal use, using alone, repeated family or social problems, mood swings and loss of control    Observed or reported (withdrawal symptoms, check all that apply)  none reported or displayed    Chemical use most recent 12 months outside a facility and other significant use history (client self-report)  Primary Drug Used  Age of First Use  Most Recent Pattern of Use and Duration    Date of last use  Time if substance use in the last 30 days Withdrawal Potential? Requiring special care  Method of use   (oral, smoked, snort, IV, etc)    Alcohol          Marijuana/Hashish          Cocaine/Crack  20 Daily use. Up to $150 per day. 2018   Smoke   Meth/Amphetamines      "     Heroin          Other Opiates/Synthetics          Inhalants          Benzodiazepines          Hallucinogens          Barbiturates/Sedatives/Hypnotics          Over-the-Counter Drugs          Other          Nicotine   2-3 cigarettes per day 18   Smoke       Dimension I Risk Ratin  Reason Risk Rating Assigned: Patient reports last use of cocaine in 2018. Patient denies withdrawal symptoms.        Dimension II Biomedical Conditions:    Any known health conditions: Yes    Ever previously treated/diagnosed with any eating disorder?  no     List Health Concerns/Conditions Reported:   Patient Active Problem List   Diagnosis   ? Mixed hyperlipidemia   ? Chronic Pain   ? Lumbar radiculopathy   ? Chronic depression   ? Tobacco dependence   ? Peripheral neuropathy   ? Hypertension with goal blood pressure less than 140/90   ? Environmental and seasonal allergies   ? ANGELINE III (cervical intraepithelial neoplasia grade III) with severe dysplasia   ? Cocaine use   ? DDD (degenerative disc disease), lumbosacral   ? Hypothyroidism   ? Microalbuminuria   ? Mixed, or nondependent drug abuse, in remission (H)   ? Schizoaffective disorder, bipolar type (H)   ? Syphilis   ? 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 Irregular heartbeat   ? Abnormal Pap smear of cervix     Does patient indicate awareness of any association between substance use and listed health concerns/conditions? No    Physical/Health Conditions which are associated with substance use: Unknown    Are Health Concerns/Conditions being treated? Yes  By Whom? India Forde, [facility]    Patient Self-Reported Medications:    Current Outpatient Medications:   ?  acetaminophen (TYLENOL) 325 MG tablet, Take 1 tablet (325 mg total) by mouth 4 (four) times a day., Disp: 120 tablet, Rfl: 5  ?  aspirin 81 MG EC tablet, Take 1 tablet (81 mg total) by mouth daily., Disp: 30 tablet, Rfl: 11  ?  atorvastatin (LIPITOR) 20 MG tablet,  Take 1 tablet (20 mg total) by mouth daily., Disp: 30 tablet, Rfl: 3  ?  blood glucose meter (GLUCOMETER), Use 1 each As Directed as needed. Dispense glucometer brand per patient's insurance at pharmacy discretion., Disp: 1 each, Rfl: 0  ?  blood glucose test (ACCU-CHEK SMARTVIEW TEST STRIP) strips, Use 1 each As Directed 3 (three) times a day. Dispense brand per patient's insurance at pharmacy discretion., Disp: 100 each, Rfl: 11  ?  cetirizine (ZYRTEC) 10 MG tablet, Take 1 tablet (10 mg total) by mouth daily., Disp: 30 tablet, Rfl: 5  ?  divalproex (DEPAKOTE DR) 250 MG 12 hour tablet, Take 1 tablet (250 mg total) by mouth 3 (three) times a day., Disp: 180 tablet, Rfl: 5  ?  dulaglutide (TRULICITY) 1.5 mg/0.5 mL PnIj, Inject 1.5 mg under the skin once a week., Disp: 4 Syringe, Rfl: 3  ?  dulaglutide 1.5 mg/0.5 mL PnIj, Inject 1.5 mg under the skin once a week., Disp: , Rfl:   ?  fluticasone (FLONASE) 50 mcg/actuation nasal spray, 2 sprays into each nostril daily., Disp: 16 g, Rfl: 5  ?  gabapentin (NEURONTIN) 300 MG capsule, Take 900 mg by mouth., Disp: , Rfl:   ?  generic lancets, 3 times daily. Test 3 times per day., Disp: , Rfl:   ?  ibuprofen (ADVIL,MOTRIN) 800 MG tablet, Take 1 tablet (800 mg total) by mouth every 8 (eight) hours as needed for pain or fever. Take with food., Disp: 60 tablet, Rfl: 0  ?  lisinopril (PRINIVIL,ZESTRIL) 20 MG tablet, Take 1 tablet (20 mg total) by mouth daily., Disp: 30 tablet, Rfl: 3  ?  omeprazole (PRILOSEC) 20 MG capsule, Take 1 capsule (20 mg total) by mouth daily., Disp: 30 capsule, Rfl: 3  ?  QUEtiapine (SEROQUEL) 100 MG tablet, Take 1 tablet (100 mg total) by mouth 2 (two) times a day., Disp: 60 tablet, Rfl: 5  ?  sitaGLIPtin (JANUVIA) 100 MG tablet, Take 1 tablet (100 mg total) by mouth daily., Disp: 30 tablet, Rfl: 3  ?  terbinafine HCl (LAMISIL) 1 % cream, Apply topically to affected areas BID, Disp: 45 g, Rfl: 0  ?  traMADol (ULTRAM) 50 mg tablet, TAKE 1 TABLET(50 MG) BY  MOUTH DAILY AS NEEDED, Disp: 30 tablet, Rfl: 0  ?  traZODone (DESYREL) 100 MG tablet, Take 100 mg by mouth., Disp: , Rfl:   ?  traZODone (DESYREL) 100 MG tablet, Take 1 tablet (100 mg total) by mouth at bedtime., Disp: 90 tablet, Rfl: 1    Are you pregnant: No OB care received:NA CPS call needed: NA    Dimension II Risk Ratin  Reason Risk Rating Assigned: Patient has several medical conditions. Patient has [employer] insurance. Patient has primary care provider. Patient is able to seek cares as needed.        Dimension III Emotional/Behavioral/Cognitive:    Oriented to person, place, time, situation?  Yes     Current Mental Health Services: yes - has mental health  but no provider at this time.    Past Hospitalization for MH or psychiatric problems: Yes    How many Hospitalizations: 3   Last Hospitalization; date and location: Went to [facility] 3 different times. Does not remember the years.      Past or Current Issues with Gambling (Explain): no    Prior Treatment for Gambling: No     MH Diagnoses:    Schizophrenia, anxiety, bi-polar  Psychiatrist: None     Clinic: None      Current Psychotropic Medications:  See above    Taking medications as prescribed:  Yes   Medications Helpful: Yes    Current Suicidal Ideation: No  If yes, any plan? NA What is plan?:   NA    Previous Suicide Attempts?  No   Explain: NA     Current Homicidal Ideation: No  If yes, any plan? NA  What is plan?: NA    Previous Homicide Attempts? No Explain: NA    Suicidal/Homicidal Ideation in last 30 days? No  Explain: NA     Hazardous behavior engaged in which placed self or others in danger (i.e., operating a motor vehicle, unsafe sex, sharing needles, etc.)?   None    Family history of substance and/or mental health diagnosis/issues?  No  Explain: NA     History of abuse (Physical, Emotional, Sexual)? No  Explain: NA       Dimension III Risk Ratin  Reason Risk Rating Assigned: Patient reports schiziphrenia, anxiety, and  bi-polar. Patient does not currently have a mental health care provider but is working with a mental health . Patient denies suicidal and homicidal ideations.        Dimension IV Readiness to Change:    Mandated, or coerced into assessment or treatment:  No    Does client feel there is a problem:   Yes    Verbalization of need/desire to change:   Yes     Willing to follow treatment recommendations: Yes     Impression of : (Check all that apply):    cooperative and genuinely motivated    Are there any spiritual, cultural, or other special needs to be addressed for client to be successful in treatment? no        Dimension IV Risk Ratin  Reason Risk Rating Assigned: Patient verbalizes a readiness and willingness for change.        Dimension V Relapse/Continued Use/Continued Problem Potential     Client age at First Treatment: 46    Lifetime # of CD Treatments:  2  List program, dates, and status of completion (within last five years): [facility] in 2018. [facility] in 2017-did not complete.    Longest Period of Abstinence: 3 1/2 years  How did you accomplish this? Work, going to school, staying busy, volunteering.     Circumstances which led to Relapse: Stella  2016.    Risk Taking/Problem Behaviors Related to Use and/or Under the Influence: None      Dimension V Risk Rating: 3  Reason Risk Rating Assigned: Patient lacks healthy, positive coping skills. Patient lacks skills to prevent relapse. Patient has had 2 treatment episodes, both of which she did not complete. Patient has had significant sobriety in the past.        Dimension VI Recovery Environment   Family support:  Yes  Peer Sober Support:  Yes    Current living circumstances:  Homeless-staying with daughter temporarily.    Environment supportive of recovery:  Yes    Specific activities participating in which do not involve substance use:  Volunteering, write poetry, write gospel music.    Specific activities participating in which  do involve substance use:  None    People, things that threaten recovery: Some people, but she identifies them as triggers and puts boundaries there.    Expected family involvement during treatment services:  None    Current Legal Involvement:  [facility] for terroristic threats, burglary, and violating OFP.    Legal Consequences related to use: Violating OFP, possession charges, manufacturing and delivering of controlled substance,     Occupational/Academic consequences related to use: None    Current ability to function in a work and/or education setting: Well.    Current support network for recovery (including community-based recovery support): No recent attendance-wants to start.    Do you belong to a [place]: No Which [place]? NA  Reside on reservation: No     Dimension VI Risk Rating: 3 Reason Risk Rating Assigned: Patient is unemployed,receives disability. Patient does not have stable housing. Patient reports she has a support system. Patient has some structured daily activity. Patient is currently on probation with [facility]. Patient has significant criminal history.          DSM-V Criteria for Substance Abuse  Instructions:  Determine whether the client currently meets the criteria for a Substance Use Disorder using the diagnostic criteria in the  DSM-V, pp. 481-589. Current means during the most recent 12 months outside a facility that controls access to substances.    Category of substance Severity ICD-10 Code/DSM V Code  Alcohol Use Disorder Mild  Moderate  Severe (F10.10) (305.00)  (F10.20) (303.90)  (F10.20) (303.90)   Cannabis Use Disorder Mild  Moderate  Severe (F12.10) (305.20)  (F12.20) (304.30)  (F12.20) (304.30)   Hallucinogen Use Disorder Mild  Moderate  Severe (F16.10) (305.30)  (F16.20) (304.50)  (F16.20) (304.50)   Inhalant Use Disorder Mild  Moderate  Severe (F18.10) (305.90)  (F18.20) (304.60)  (F18.20) (304.60)   Opioid Use Disorder Mild  Moderate  Severe (F11.10) (305.50)  (F11.20)  (304.00)  (F11.20) (304.00)   Sedative, Hypnotic, or Anxiolytic Use Disorder Mild  Moderate  Severe (F13.10) (305.40)  (F13.20) (304.10)  (F13.20) (304.10)   Stimulant Related Disorders Mild              Moderate              Severe   (F15.10) (305.70) Amphetamine type substance  (F14.10) (305.60) Cocaine  (F15.10) (305.70) Other or unspecified stimulant    (F15.20) (304.40) Amphetamine type substance  (F14.20) (304.20) Cocaine  (F15.20) (304.40) Other or unspecified stimulant    (F15.20) (304.40) Amphetamine type substance  (F14.20) (304.20) Cocaine  (F15.20) (304.40) Other or unspecified stimulant   DisorderTobacco use Disorder Mild  Moderate  Severe (Z72.0) (305.1)  (F17.200) (305.1)  (F17.200) (305.1)   Other (or unknown) Substance Use Disorder Mild  Moderate  Severe (F19.10) (305.90)  (F19.20) (304.90)  (F19.20) (304.90)     Diagnostic Impression: Stimulant Use Disorder, Moderate, (F14.20) (304.20) Cocaine    Assessment Completed Within 3 Sessions of Admission: Yes  If NO, date assessment to be completed noted in Treatment Plan: NA      Signature of Counselor: Brandee Acuna  Date and Time of Signature: 02/06/19, 2:37 PM

## 2021-07-20 NOTE — TELEPHONE ENCOUNTER
----- Message from Rossana Carver sent at 12/23/2019  7:44 AM CST -----  Please send all scheduling messages to the [facility] Scheduling Registration Pool as I am not here on Friday's.  Patient has already been rescheduled to 02-17-20.  Please advise.  ----- Message -----  From: Rossana Carver  Sent: 12/23/2019   7:43 AM CST  To: Yariel Alba MD, #    FYI - NOTED ON APPT DESK AS WELL  ----- Message -----  From: Yariel Alba MD  Sent: 12/20/2019   9:09 AM CST  To: Rossana Carver    Please do not schedule

## 2021-07-20 NOTE — PROGRESS NOTES
Spoke with patients daughter again and informed to have patient call us back. No detailed info given. When patient calls back please schedule an appointment with Dr Biggs under visit type establish care/ follow up from 4/11 on or after 5/11/18.    Mailed out letter and postponing out 2 weeks.

## 2021-07-20 NOTE — TELEPHONE ENCOUNTER
2020 Prior Authorization Request  Who's requesting PA: Pharmacy  Provider: Parth  Pharmacy Name, Location & Phone # : [facility] [address] [place]  Medication Name: memantine 7-14-21-28 mg C24k  Quantity:28  Days Supply: 28  Medication Instructions: Take one capsule by mouth daily.  Insurance Plan:   Insurance Member ID & phone number: [phone], ID # [id number]  Informed patient that prior authorizations can take up to 10 business days for response: YES  Okay to leave a detailed message: YES    Route to: [facility] PA MED ([id number])

## 2021-07-20 NOTE — PROGRESS NOTES
Lorie Vann attended 3 hours of group therapy today.    Total group size of 11.    10/23/2018 1:30 PM Augustina Leblanc

## 2021-07-20 NOTE — LETTER
Letter by Yariel Chilel DO at      Author: Yariel Chilel DO Service: -- Author Type: --    Filed:  Encounter Date: 3/23/2019 Status: (Other)         Lorie Vann  [address]  [place] [address]                 April 3, 2019       Dear Ms. Vann,     We receive a refill request for tramadol. I need to review your medication list with you to determine  which medications need refills - this medicine hasn't been filled in months. I need to know which refills  you need. I got  A generic voice mail on one number and constant busy signal on the other number.  Please contact us back when you receive this letter.     Please call with questions or contact us using [employer].      Sincerely,        Electronically signed by Yariel Chilel DO

## 2021-07-20 NOTE — TELEPHONE ENCOUNTER
"Telephone Encounter by Bree Leach at 5/4/2020  8:14 AM     Author: Bree Leach Service: -- Author Type: --    Filed: 5/4/2020  8:15 AM Encounter Date: 4/30/2020 Status: Signed    : Bree Leach       PRIOR AUTHORIZATION DENIED    Denial Rational: Medication is only covered for FDA approved diagnosis.  It is not covered for any \"off label' use.                            "

## 2021-07-20 NOTE — PROGRESS NOTES
Individual Treatment Plan    Patient  Name: Lorie Vann  MRN: [id number]   : 1970  Admit Date: 2019  Date of Initial Service Plan: 2019  Tentative Discharge Date: 19  Diagnosis: Cocaine Use Disorder, Moderate (F14.20) (304.20) Cocaine.    Counselor: Barndee Acuna      Dimension 1: Acute Intoxication/Withdrawal Potential, Risk level: 0    Problem Statement from Comprehensive Assessment: on 2019  Patient reports last use of cocaine in 2018. Patient denies withdrawal symptoms.    Problem: Cocaine Use Disorder  Goal: Patient to maintain abstinence throughout outpatient treatment.   Must be reached to complete treatment? Yes  Methods/Strategies (must include amount and frequency):   1. Patient to report any substance/alcohol use to counselor to determine if any changes need to be made to address withdrawal symptoms.   2. Patient to complete any requested UAs.   Target Date: 19  Completion Date:     Dimension 2: Biomedical Conditions/Complications, Risk level: 1    Problem Statement from Comprehensive Assessment: on 2019  Patient has several medical conditions. Patient has [employer] insurance. Patient has primary care provider. Patient is able to seek cares as needed.    Problem: Multiple medical conditions  Goal: Patient to maintain stable health throughout outpatient treatment.   Must be reached to complete treatment? No  Methods/Strategies (must include amount and frequency):   1. Patient to report any changes to physical health to counselor.   2. Patient to attend all scheduled doctor s appointments.   3. Patient to take medications as prescribed.   Target Date: 19  Completion Date:     Problem: Patient reports current tobacco use.   Goal: Patient to receive information about smoking cessation.   Must be reached to complete treatment? No  Methods/Strategies (must include amount and frequency):   1. Staff to provide patient with nicotine cessation information and help on how  to quit use.   2. Patient to report any progress on stopping nicotine use to staff.   Target Date: 5/7/19  Completion Date:     Dimension 3: Emotional/Behavioral/Cognitive, Risk level: 2    Problem Statement from Comprehensive Assessment: on 2/6/2019:  Patient reports schiziphrenia, anxiety, and bi-polar. Patient does not currently have a mental health care provider but is working with a mental health . Patient denies suicidal and homicidal ideations.     Problem: Schiziphrenia, anxiety, and bi-polar  Goal: Stable mental health.  Must be reached to complete treatment? No  Methods/Strategies (must include amount and frequency):   1. Patient to begin using coping skills learned in therapeutic group (such as grounding, breathing, thought challenging, mindfulness, etc), and share in daily check-in any benefits or challenges that you experience using these skills.  Target Date: 5/7/19  Completion Date:       Dimension 4: Readiness to Change, Risk level 0    Problem Statement from Comprehensive Assessment: on 2/6/2019:  Patient verbalizes a readiness and willingness for change.    Problem: Ongoing motivation  Goal: Patient to increase motivation towards recovery by participating in outpatient programming.   Must be reached to complete treatment? Yes  Methods/Strategies (must include amount and frequency):   1. Patient to attend 4, 3-hour groups per week and all scheduled 1:1s.  2. Patient will contact staff if unable to attend   Target Date: 5/7/19  Completion Date:     Dimension 5: Relapse/Continued Use/Continued Problem Potential, Risk level: 3    Problem Statement from Comprehensive Assessment: on 2/6/2019:  Patient lacks healthy, positive coping skills. Patient lacks skills to prevent relapse. Patient has had 2 treatment episodes, both of which she did not complete. Patient has had significant sobriety in the past    Problem: Continued use.  Goal: Develop relapse prevention skills  Must be reached to  complete treatment? Yes  Methods/Strategies (must include amount and frequency):   Patient to share in daily check-in any urges and addictive thinking to better understand her pattern of use and to prevent relapse in the future.   Target Date: 5/7/19  Completion Date:     Dimension 6: Recovery Environment, Risk level: 3    Problem Statement from Comprehensive Assessment: on 2/6/2019:  Patient is unemployed,receives disability. Patient does not have stable housing. Patient reports she has a support system. Patient has some structured daily activity. Patient is currently on probation with [facility]. Patient has significant criminal history.    Problem: Lacks daily structure.  Goal: Add activities to day.  Must be reached to complete treatment? Yes  Methods/Strategies (must include amount and frequency): Attend group as scheduled.  Target Date: 5/7/19  Completion Date:     Resources  Resources to which the patient is being referred for problems when problems are to be addressed concurrently by another provider:         By signing this document, I am acknowledging that I was actively and directly involved in the development of my treatment plan.           Patient  Signature_________________________________________         Date__________________        Staff Signature  Brandee Acuna    Date: 2/6/2019,

## 2021-07-20 NOTE — PROGRESS NOTES
Lorie Vann attended 3 hours of group therapy today.    Total group size of 9.    10/31/2018 1:29 PM Bhumika Easton

## 2021-07-20 NOTE — TELEPHONE ENCOUNTER
Controlled Substance Refill Request  Medication Name:   Requested Prescriptions     Pending Prescriptions Disp Refills     traMADol (ULTRAM) 50 mg tablet 30 tablet 0     Date Last Fill: 7/17/19  [facility]  Submit electronically to pharmacy  Controlled Substance Agreement Date Scanned:   Encounter-Level CSA Scan Date:    There are no encounter-level csa scan date.       Last office visit with prescriber/PCP: 5/29/2018 India Forde MD OR same dept: 10/25/2018 Prabhjot Mckeon MD OR same specialty: 10/25/2018 Prabhjot Mckeon MD  Last physical: 3/22/2019 Last MTM visit: Visit date not found      Patient has an appointment to the pain clinic on 5/14/19. Patient is asking for a refill to be sent until she can be seen.

## 2021-07-20 NOTE — TELEPHONE ENCOUNTER
Fax received from [facility] pharmacy requesting Prior Authorization    Medication Name: Tramadol 50mg tablets    Insurance Plan:    PBM: [facility]   Patient ID Number: [id number]    Please start PA process

## 2021-07-20 NOTE — TELEPHONE ENCOUNTER
Orders being requested: Compression Stocking   Reason service is needed/diagnosis: original order request from 04/25/19 states peripheral neuropathy.    Rani from [facility] states that the order received was incomplete.  They need the specific compression strength for the stocking to be able to process the request.      Patient also ask the medical supply about an order for a Knee brace, however no previous note mentions knee brace.   Last OFV 03/22/19  When are orders needed by: as soon as able   Where to send Orders: Phone: [phone]   Okay to leave detailed message?  Yes

## 2021-07-20 NOTE — TELEPHONE ENCOUNTER
Refill Approved (glucometer)     Rx renewed per Medication Renewal Policy. Medication was last renewed on 10/14/18    Galindo Varner, [facility] Triage/Med Refill 3/17/2019     Requested Prescriptions   Pending Prescriptions Disp Refills     ibuprofen (ADVIL,MOTRIN) 800 MG tablet 60 tablet 0     Sig: Take 1 tablet (800 mg total) by mouth every 8 (eight) hours as needed for pain or fever. Take with food.    There is no refill protocol information for this order        blood glucose meter (GLUCOMETER) 1 each 0     Sig: Use 1 each As Directed as needed. Dispense glucometer brand per patient's insurance at pharmacy discretion.    Diabetic Supplies Refill Protocol Passed - 3/13/2019  1:44 PM       Passed - Visit with PCP or prescribing provider visit in last 6 months    Last office visit with prescriber/PCP: 5/29/2018 India Forde MD OR same dept: 10/25/2018 Prabhjot Mckeon MD OR same specialty: 10/25/2018 Prabhjot Mckeon MD  Last physical: Visit date not found Last MTM visit: Visit date not found   Next visit within 3 mo: Visit date not found  Next physical within 3 mo: Visit date not found  Prescriber OR PCP: India Forde MD  Last diagnosis associated with med order: 1. Diabetes mellitus, type 2 (H)  - blood glucose meter (GLUCOMETER); Use 1 each As Directed as needed. Dispense glucometer brand per patient's insurance at pharmacy discretion.  Dispense: 1 each; Refill: 0    2. Tinea pedis of both feet  - terbinafine HCl (LAMISIL) 1 % cream; Apply topically to affected areas BID  Dispense: 45 g; Refill: 0    If protocol passes may refill for 12 months if within 3 months of last provider visit (or a total of 15 months).            Passed - A1C in last 6 months    Hemoglobin A1c   Date Value Ref Range Status   10/25/2018 6.4 (H) 3.5 - 6.0 % Final               terbinafine HCl (LAMISIL) 1 % cream 45 g 0     Sig: Apply topically to affected areas BID    There is no refill protocol  information for this order

## 2021-07-20 NOTE — TELEPHONE ENCOUNTER
I don't know about a knee brace - patient should be re-evaluated.  I did put clarifying compression on new request for stocking.

## 2021-07-20 NOTE — PROGRESS NOTES
Lorie Vann attended 3 hours of group therapy today.    Total group size of 12.    10/19/2018 1:42 PM Kika Yoder

## 2021-07-20 NOTE — TELEPHONE ENCOUNTER
Fax received from [facility], they have started the Prior Authorization Process via Cover My Meds    CoverMyMeds Key: [id number]    Medication Name: Trulicity 1.5mg/0.5ml    Insurance Plan: [employer] [place]  PBM:   Patient ID: [id number]    Please complete the PA process
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
## 2021-10-09 ENCOUNTER — HEALTH MAINTENANCE LETTER (OUTPATIENT)
Age: 51
End: 2021-10-09

## 2022-01-29 ENCOUNTER — HEALTH MAINTENANCE LETTER (OUTPATIENT)
Age: 52
End: 2022-01-29

## 2022-05-16 ENCOUNTER — HEALTH MAINTENANCE LETTER (OUTPATIENT)
Age: 52
End: 2022-05-16

## 2022-09-11 ENCOUNTER — HEALTH MAINTENANCE LETTER (OUTPATIENT)
Age: 52
End: 2022-09-11

## 2023-01-23 ENCOUNTER — HEALTH MAINTENANCE LETTER (OUTPATIENT)
Age: 53
End: 2023-01-23

## 2023-05-06 ENCOUNTER — HEALTH MAINTENANCE LETTER (OUTPATIENT)
Age: 53
End: 2023-05-06

## 2023-06-03 ENCOUNTER — HEALTH MAINTENANCE LETTER (OUTPATIENT)
Age: 53
End: 2023-06-03

## 2023-12-16 ENCOUNTER — HEALTH MAINTENANCE LETTER (OUTPATIENT)
Age: 53
End: 2023-12-16

## 2024-02-24 ENCOUNTER — HEALTH MAINTENANCE LETTER (OUTPATIENT)
Age: 54
End: 2024-02-24

## 2024-06-17 PROBLEM — Z71.89 ADVANCED DIRECTIVES, COUNSELING/DISCUSSION: Status: RESOLVED | Noted: 2019-03-22 | Resolved: 2024-06-17
